# Patient Record
Sex: MALE | Race: AMERICAN INDIAN OR ALASKA NATIVE | NOT HISPANIC OR LATINO | Employment: UNEMPLOYED | URBAN - METROPOLITAN AREA
[De-identification: names, ages, dates, MRNs, and addresses within clinical notes are randomized per-mention and may not be internally consistent; named-entity substitution may affect disease eponyms.]

---

## 2019-06-15 ENCOUNTER — HOSPITAL ENCOUNTER (OUTPATIENT)
Facility: HOSPITAL | Age: 54
Discharge: ANOTHER HOSPITAL | DRG: 914 | End: 2019-06-15
Attending: EMERGENCY MEDICINE | Admitting: EMERGENCY MEDICINE
Payer: MEDICAID

## 2019-06-15 ENCOUNTER — HOSPITAL ENCOUNTER (OUTPATIENT)
Facility: HOSPITAL | Age: 54
Discharge: HOME | End: 2019-06-16
Attending: INTERNAL MEDICINE | Admitting: INTERNAL MEDICINE
Payer: MEDICAID

## 2019-06-15 VITALS
WEIGHT: 203.13 LBS | BODY MASS INDEX: 31.88 KG/M2 | HEIGHT: 67 IN | DIASTOLIC BLOOD PRESSURE: 64 MMHG | TEMPERATURE: 98 F | HEART RATE: 61 BPM | RESPIRATION RATE: 18 BRPM | OXYGEN SATURATION: 100 % | SYSTOLIC BLOOD PRESSURE: 102 MMHG

## 2019-06-15 DIAGNOSIS — R33.9 URINARY RETENTION: Primary | ICD-10-CM

## 2019-06-15 DIAGNOSIS — Z72.0 TOBACCO ABUSE: ICD-10-CM

## 2019-06-15 DIAGNOSIS — N50.82 SCROTAL PAIN: ICD-10-CM

## 2019-06-15 DIAGNOSIS — N50.89 SCROTAL EDEMA: ICD-10-CM

## 2019-06-15 DIAGNOSIS — R52 PAIN: ICD-10-CM

## 2019-06-15 DIAGNOSIS — N48.89 PENILE EDEMA: ICD-10-CM

## 2019-06-15 DIAGNOSIS — N48.89 PENILE PAIN: ICD-10-CM

## 2019-06-15 DIAGNOSIS — S39.94XA: ICD-10-CM

## 2019-06-15 DIAGNOSIS — S39.94XA INJURY TO PENIS, INITIAL ENCOUNTER: ICD-10-CM

## 2019-06-15 DIAGNOSIS — R33.9 URINARY RETENTION: ICD-10-CM

## 2019-06-15 DIAGNOSIS — N48.89 PENILE PAIN: Primary | ICD-10-CM

## 2019-06-15 PROBLEM — N49.3 FOURNIER'S GANGRENE: Status: ACTIVE | Noted: 2019-06-15

## 2019-06-15 PROBLEM — N48.9 PENIS DISORDER: Status: ACTIVE | Noted: 2019-06-15

## 2019-06-15 LAB
ALBUMIN SERPL BCP-MCNC: 3.9 G/DL (ref 3.5–5.2)
ALP SERPL-CCNC: 79 U/L (ref 55–135)
ALT SERPL W/O P-5'-P-CCNC: 11 U/L (ref 10–44)
AMPHET+METHAMPHET UR QL: NEGATIVE
ANION GAP SERPL CALC-SCNC: 13 MMOL/L (ref 8–16)
AST SERPL-CCNC: 19 U/L (ref 10–40)
BARBITURATES UR QL SCN>200 NG/ML: NEGATIVE
BASOPHILS # BLD AUTO: 0.04 K/UL (ref 0–0.2)
BASOPHILS NFR BLD: 0.3 % (ref 0–1.9)
BENZODIAZ UR QL SCN>200 NG/ML: NEGATIVE
BILIRUB SERPL-MCNC: 0.5 MG/DL (ref 0.1–1)
BILIRUB UR QL STRIP: NEGATIVE
BUN SERPL-MCNC: 11 MG/DL (ref 6–20)
BZE UR QL SCN: NEGATIVE
CALCIUM SERPL-MCNC: 9.3 MG/DL (ref 8.7–10.5)
CANNABINOIDS UR QL SCN: NEGATIVE
CHLORIDE SERPL-SCNC: 104 MMOL/L (ref 95–110)
CLARITY UR: CLEAR
CO2 SERPL-SCNC: 21 MMOL/L (ref 23–29)
COLOR UR: YELLOW
CREAT SERPL-MCNC: 1.1 MG/DL (ref 0.5–1.4)
CREAT UR-MCNC: 26.5 MG/DL (ref 23–375)
CRP SERPL-MCNC: 23.4 MG/L (ref 0–8.2)
DIFFERENTIAL METHOD: ABNORMAL
EOSINOPHIL # BLD AUTO: 0.4 K/UL (ref 0–0.5)
EOSINOPHIL NFR BLD: 2.9 % (ref 0–8)
ERYTHROCYTE [DISTWIDTH] IN BLOOD BY AUTOMATED COUNT: 13.3 % (ref 11.5–14.5)
ERYTHROCYTE [SEDIMENTATION RATE] IN BLOOD BY WESTERGREN METHOD: 9 MM/HR (ref 0–10)
EST. GFR  (AFRICAN AMERICAN): >60 ML/MIN/1.73 M^2
EST. GFR  (NON AFRICAN AMERICAN): >60 ML/MIN/1.73 M^2
GLUCOSE SERPL-MCNC: 97 MG/DL (ref 70–110)
GLUCOSE UR QL STRIP: NEGATIVE
HCT VFR BLD AUTO: 40.1 % (ref 40–54)
HGB BLD-MCNC: 13.3 G/DL (ref 14–18)
HGB UR QL STRIP: ABNORMAL
KETONES UR QL STRIP: NEGATIVE
LACTATE SERPL-SCNC: 1.4 MMOL/L (ref 0.5–2.2)
LEUKOCYTE ESTERASE UR QL STRIP: NEGATIVE
LYMPHOCYTES # BLD AUTO: 3.9 K/UL (ref 1–4.8)
LYMPHOCYTES NFR BLD: 31.5 % (ref 18–48)
MCH RBC QN AUTO: 33.2 PG (ref 27–31)
MCHC RBC AUTO-ENTMCNC: 33.2 G/DL (ref 32–36)
MCV RBC AUTO: 100 FL (ref 82–98)
METHADONE UR QL SCN>300 NG/ML: NEGATIVE
MICROSCOPIC COMMENT: ABNORMAL
MONOCYTES # BLD AUTO: 1.3 K/UL (ref 0.3–1)
MONOCYTES NFR BLD: 10.5 % (ref 4–15)
NEUTROPHILS # BLD AUTO: 6.7 K/UL (ref 1.8–7.7)
NEUTROPHILS NFR BLD: 54.8 % (ref 38–73)
NITRITE UR QL STRIP: NEGATIVE
OPIATES UR QL SCN: NEGATIVE
PCP UR QL SCN>25 NG/ML: NEGATIVE
PH UR STRIP: 6 [PH] (ref 5–8)
PLATELET # BLD AUTO: 278 K/UL (ref 150–350)
PMV BLD AUTO: 10 FL (ref 9.2–12.9)
POTASSIUM SERPL-SCNC: 3.6 MMOL/L (ref 3.5–5.1)
PROCALCITONIN SERPL IA-MCNC: 0.03 NG/ML
PROT SERPL-MCNC: 7.2 G/DL (ref 6–8.4)
PROT UR QL STRIP: NEGATIVE
RBC # BLD AUTO: 4.01 M/UL (ref 4.6–6.2)
RBC #/AREA URNS HPF: 25 /HPF (ref 0–4)
SODIUM SERPL-SCNC: 138 MMOL/L (ref 136–145)
SP GR UR STRIP: <=1.005 (ref 1–1.03)
TOXICOLOGY INFORMATION: NORMAL
URN SPEC COLLECT METH UR: ABNORMAL
UROBILINOGEN UR STRIP-ACNC: NEGATIVE EU/DL
WBC # BLD AUTO: 12.23 K/UL (ref 3.9–12.7)

## 2019-06-15 PROCEDURE — 85651 RBC SED RATE NONAUTOMATED: CPT

## 2019-06-15 PROCEDURE — 80053 COMPREHEN METABOLIC PANEL: CPT

## 2019-06-15 PROCEDURE — S0077 INJECTION, CLINDAMYCIN PHOSP: HCPCS | Performed by: EMERGENCY MEDICINE

## 2019-06-15 PROCEDURE — 96375 TX/PRO/DX INJ NEW DRUG ADDON: CPT | Performed by: EMERGENCY MEDICINE

## 2019-06-15 PROCEDURE — 63600175 PHARM REV CODE 636 W HCPCS: Performed by: NURSE PRACTITIONER

## 2019-06-15 PROCEDURE — 99233 PR SUBSEQUENT HOSPITAL CARE,LEVL III: ICD-10-PCS | Mod: ,,, | Performed by: SURGERY

## 2019-06-15 PROCEDURE — 11000001 HC ACUTE MED/SURG PRIVATE ROOM

## 2019-06-15 PROCEDURE — 83605 ASSAY OF LACTIC ACID: CPT

## 2019-06-15 PROCEDURE — 36415 COLL VENOUS BLD VENIPUNCTURE: CPT

## 2019-06-15 PROCEDURE — 96375 TX/PRO/DX INJ NEW DRUG ADDON: CPT

## 2019-06-15 PROCEDURE — 85025 COMPLETE CBC W/AUTO DIFF WBC: CPT

## 2019-06-15 PROCEDURE — 25000003 PHARM REV CODE 250: Performed by: STUDENT IN AN ORGANIZED HEALTH CARE EDUCATION/TRAINING PROGRAM

## 2019-06-15 PROCEDURE — 25000003 PHARM REV CODE 250: Performed by: NURSE PRACTITIONER

## 2019-06-15 PROCEDURE — 99285 EMERGENCY DEPT VISIT HI MDM: CPT | Mod: 25

## 2019-06-15 PROCEDURE — A9585 GADOBUTROL INJECTION: HCPCS | Performed by: HOSPITALIST

## 2019-06-15 PROCEDURE — 63600175 PHARM REV CODE 636 W HCPCS: Performed by: EMERGENCY MEDICINE

## 2019-06-15 PROCEDURE — 25000003 PHARM REV CODE 250: Performed by: EMERGENCY MEDICINE

## 2019-06-15 PROCEDURE — 25500020 PHARM REV CODE 255: Performed by: EMERGENCY MEDICINE

## 2019-06-15 PROCEDURE — 87040 BLOOD CULTURE FOR BACTERIA: CPT

## 2019-06-15 PROCEDURE — 86140 C-REACTIVE PROTEIN: CPT

## 2019-06-15 PROCEDURE — 99220 PR INITIAL OBSERVATION CARE,LEVL III: CPT | Mod: ,,, | Performed by: HOSPITALIST

## 2019-06-15 PROCEDURE — 80307 DRUG TEST PRSMV CHEM ANLYZR: CPT

## 2019-06-15 PROCEDURE — 25500020 PHARM REV CODE 255: Performed by: HOSPITALIST

## 2019-06-15 PROCEDURE — 84145 PROCALCITONIN (PCT): CPT

## 2019-06-15 PROCEDURE — 96365 THER/PROPH/DIAG IV INF INIT: CPT | Mod: 59

## 2019-06-15 PROCEDURE — 96376 TX/PRO/DX INJ SAME DRUG ADON: CPT | Performed by: EMERGENCY MEDICINE

## 2019-06-15 PROCEDURE — G0378 HOSPITAL OBSERVATION PER HR: HCPCS

## 2019-06-15 PROCEDURE — 96374 THER/PROPH/DIAG INJ IV PUSH: CPT

## 2019-06-15 PROCEDURE — G0379 DIRECT REFER HOSPITAL OBSERV: HCPCS

## 2019-06-15 PROCEDURE — 99220 PR INITIAL OBSERVATION CARE,LEVL III: ICD-10-PCS | Mod: ,,, | Performed by: HOSPITALIST

## 2019-06-15 PROCEDURE — 81000 URINALYSIS NONAUTO W/SCOPE: CPT | Mod: 59

## 2019-06-15 PROCEDURE — 99233 SBSQ HOSP IP/OBS HIGH 50: CPT | Mod: ,,, | Performed by: SURGERY

## 2019-06-15 RX ORDER — HYDROCODONE BITARTRATE AND ACETAMINOPHEN 5; 325 MG/1; MG/1
1 TABLET ORAL ONCE
Status: DISCONTINUED | OUTPATIENT
Start: 2019-06-15 | End: 2019-06-16 | Stop reason: HOSPADM

## 2019-06-15 RX ORDER — CLINDAMYCIN PHOSPHATE 900 MG/50ML
900 INJECTION, SOLUTION INTRAVENOUS EVERY 8 HOURS
Qty: 50 ML | Refills: 0 | Status: ON HOLD
Start: 2019-06-15 | End: 2019-06-16 | Stop reason: HOSPADM

## 2019-06-15 RX ORDER — CLINDAMYCIN PHOSPHATE 900 MG/50ML
900 INJECTION, SOLUTION INTRAVENOUS
Status: COMPLETED | OUTPATIENT
Start: 2019-06-15 | End: 2019-06-15

## 2019-06-15 RX ORDER — OXYCODONE HYDROCHLORIDE 10 MG/1
10 TABLET ORAL EVERY 6 HOURS PRN
Status: DISCONTINUED | OUTPATIENT
Start: 2019-06-15 | End: 2019-06-16 | Stop reason: HOSPADM

## 2019-06-15 RX ORDER — OXYCODONE HYDROCHLORIDE 10 MG/1
10 TABLET ORAL EVERY 6 HOURS PRN
Status: DISCONTINUED | OUTPATIENT
Start: 2019-06-15 | End: 2019-06-15

## 2019-06-15 RX ORDER — IBUPROFEN 600 MG/1
600 TABLET ORAL EVERY 6 HOURS PRN
Status: DISCONTINUED | OUTPATIENT
Start: 2019-06-15 | End: 2019-06-16 | Stop reason: HOSPADM

## 2019-06-15 RX ORDER — MORPHINE SULFATE 2 MG/ML
2 INJECTION, SOLUTION INTRAMUSCULAR; INTRAVENOUS ONCE
Status: COMPLETED | OUTPATIENT
Start: 2019-06-15 | End: 2019-06-15

## 2019-06-15 RX ORDER — ONDANSETRON 8 MG/1
8 TABLET, ORALLY DISINTEGRATING ORAL EVERY 8 HOURS PRN
Status: DISCONTINUED | OUTPATIENT
Start: 2019-06-15 | End: 2019-06-16 | Stop reason: HOSPADM

## 2019-06-15 RX ORDER — CLINDAMYCIN PHOSPHATE 900 MG/50ML
900 INJECTION, SOLUTION INTRAVENOUS
Status: DISCONTINUED | OUTPATIENT
Start: 2019-06-15 | End: 2019-06-15 | Stop reason: HOSPADM

## 2019-06-15 RX ORDER — GADOBUTROL 604.72 MG/ML
10 INJECTION INTRAVENOUS
Status: COMPLETED | OUTPATIENT
Start: 2019-06-15 | End: 2019-06-15

## 2019-06-15 RX ORDER — SODIUM CHLORIDE 0.9 % (FLUSH) 0.9 %
10 SYRINGE (ML) INJECTION
Status: DISCONTINUED | OUTPATIENT
Start: 2019-06-15 | End: 2019-06-16 | Stop reason: HOSPADM

## 2019-06-15 RX ORDER — PROCHLORPERAZINE EDISYLATE 5 MG/ML
10 INJECTION INTRAMUSCULAR; INTRAVENOUS EVERY 8 HOURS PRN
Status: DISCONTINUED | OUTPATIENT
Start: 2019-06-15 | End: 2019-06-16 | Stop reason: HOSPADM

## 2019-06-15 RX ORDER — MORPHINE SULFATE 4 MG/ML
4 INJECTION, SOLUTION INTRAMUSCULAR; INTRAVENOUS
Status: COMPLETED | OUTPATIENT
Start: 2019-06-15 | End: 2019-06-15

## 2019-06-15 RX ORDER — MORPHINE SULFATE 4 MG/ML
2 INJECTION, SOLUTION INTRAMUSCULAR; INTRAVENOUS
Status: COMPLETED | OUTPATIENT
Start: 2019-06-15 | End: 2019-06-15

## 2019-06-15 RX ORDER — ONDANSETRON 2 MG/ML
4 INJECTION INTRAMUSCULAR; INTRAVENOUS
Status: COMPLETED | OUTPATIENT
Start: 2019-06-15 | End: 2019-06-15

## 2019-06-15 RX ORDER — OXYCODONE HYDROCHLORIDE 5 MG/1
5 TABLET ORAL EVERY 6 HOURS PRN
Status: DISCONTINUED | OUTPATIENT
Start: 2019-06-15 | End: 2019-06-16 | Stop reason: HOSPADM

## 2019-06-15 RX ORDER — HYDROCODONE BITARTRATE AND ACETAMINOPHEN 10; 325 MG/1; MG/1
1 TABLET ORAL EVERY 6 HOURS PRN
Status: DISCONTINUED | OUTPATIENT
Start: 2019-06-15 | End: 2019-06-15 | Stop reason: HOSPADM

## 2019-06-15 RX ORDER — HYDROCODONE BITARTRATE AND ACETAMINOPHEN 7.5; 325 MG/1; MG/1
1 TABLET ORAL EVERY 6 HOURS PRN
Status: DISCONTINUED | OUTPATIENT
Start: 2019-06-15 | End: 2019-06-15 | Stop reason: HOSPADM

## 2019-06-15 RX ADMIN — SODIUM CHLORIDE 1000 ML: 0.9 INJECTION, SOLUTION INTRAVENOUS at 06:06

## 2019-06-15 RX ADMIN — MORPHINE SULFATE 2 MG: 2 INJECTION, SOLUTION INTRAMUSCULAR; INTRAVENOUS at 11:06

## 2019-06-15 RX ADMIN — PIPERACILLIN AND TAZOBACTAM 4.5 G: 4; .5 INJECTION, POWDER, LYOPHILIZED, FOR SOLUTION INTRAVENOUS; PARENTERAL at 07:06

## 2019-06-15 RX ADMIN — OXYCODONE HYDROCHLORIDE 10 MG: 10 TABLET ORAL at 06:06

## 2019-06-15 RX ADMIN — HYDROCODONE BITARTRATE AND ACETAMINOPHEN 1 TABLET: 10; 325 TABLET ORAL at 09:06

## 2019-06-15 RX ADMIN — MORPHINE SULFATE 4 MG: 4 INJECTION INTRAVENOUS at 05:06

## 2019-06-15 RX ADMIN — MORPHINE SULFATE 2 MG: 4 INJECTION INTRAVENOUS at 07:06

## 2019-06-15 RX ADMIN — CLINDAMYCIN IN 5 PERCENT DEXTROSE 900 MG: 18 INJECTION, SOLUTION INTRAVENOUS at 06:06

## 2019-06-15 RX ADMIN — IOHEXOL 75 ML: 350 INJECTION, SOLUTION INTRAVENOUS at 05:06

## 2019-06-15 RX ADMIN — ONDANSETRON 4 MG: 2 INJECTION INTRAMUSCULAR; INTRAVENOUS at 05:06

## 2019-06-15 RX ADMIN — MORPHINE SULFATE 2 MG: 2 INJECTION, SOLUTION INTRAMUSCULAR; INTRAVENOUS at 03:06

## 2019-06-15 RX ADMIN — GADOBUTROL 10 ML: 604.72 INJECTION INTRAVENOUS at 09:06

## 2019-06-15 RX ADMIN — IBUPROFEN 600 MG: 600 TABLET ORAL at 09:06

## 2019-06-15 RX ADMIN — VANCOMYCIN HYDROCHLORIDE 2500 MG: 1 INJECTION, POWDER, LYOPHILIZED, FOR SOLUTION INTRAVENOUS at 09:06

## 2019-06-15 NOTE — SUBJECTIVE & OBJECTIVE
Past Medical History:   Diagnosis Date    Tobacco abuse        Past Surgical History:   Procedure Laterality Date    NONE         Review of patient's allergies indicates:  No Known Allergies    Family History     Problem Relation (Age of Onset)    Cancer Maternal Grandmother, Maternal Grandfather          Tobacco Use    Smoking status: Current Every Day Smoker     Packs/day: 1.00     Years: 27.00     Pack years: 27.00     Types: Cigarettes   Substance and Sexual Activity    Alcohol use: Yes     Comment: occasional    Drug use: No    Sexual activity: Not on file       Review of Systems   Constitutional: Negative for activity change, appetite change, chills, fever and unexpected weight change.   HENT: Negative.    Eyes: Negative.    Respiratory: Negative for chest tightness and shortness of breath.    Cardiovascular: Negative for chest pain.   Gastrointestinal: Negative for abdominal distention, abdominal pain, nausea and vomiting.   Genitourinary: Positive for difficulty urinating, hematuria, penile pain, penile swelling and scrotal swelling. Negative for flank pain, testicular pain and urgency.   Musculoskeletal: Negative.    Skin: Negative.    Neurological: Negative.    Psychiatric/Behavioral: Negative.        Objective:     Temp:  [96.2 °F (35.7 °C)-98 °F (36.7 °C)] 96.2 °F (35.7 °C)  Pulse:  [58-92] 58  Resp:  [16-20] 18  SpO2:  [95 %-100 %] 97 %  BP: (102-153)/(62-92) 106/62     There is no height or weight on file to calculate BMI.           Drains     Drain                 Urethral Catheter 06/15/19 1130 Non-latex 16 Fr. less than 1 day                Physical Exam   Constitutional: He appears well-developed and well-nourished.   HENT:   Head: Normocephalic and atraumatic.   Eyes: EOM are normal. No scleral icterus.   Cardiovascular: Normal rate and regular rhythm.    Pulmonary/Chest: Effort normal. No respiratory distress.   Abdominal: Soft. He exhibits no distension. There is no tenderness.    Genitourinary:   Genitourinary Comments:   Penis is uncircumcised, mild to moderate diffuse edema and ecchymosis involving penile shaft and scrotum. Foreskin is able to be retracted and expose glans fully. He has pain more severe at the distal left aspect of his corpora. Aviles catheter in place draining clear yellow urine.     Scrotal wall edema, no loss of rugae. Bilateral testicles palpable and non-tender. No crepitus or fluctuance.   Musculoskeletal: He exhibits no edema.   Neurological: He is alert.   Skin: Skin is warm and dry.     Psychiatric: He has a normal mood and affect. His behavior is normal.       Significant Labs:    BMP:  Recent Labs   Lab 06/15/19  0507      K 3.6      CO2 21*   BUN 11   CREATININE 1.1   CALCIUM 9.3       CBC:  Recent Labs   Lab 06/15/19  0507   WBC 12.23   HGB 13.3*   HCT 40.1          Urine Studies:   Recent Labs   Lab 06/15/19  0556   COLORU Yellow   APPEARANCEUA Clear   PHUR 6.0   SPECGRAV <=1.005*   PROTEINUA Negative   GLUCUA Negative   KETONESU Negative   BILIRUBINUA Negative   OCCULTUA 3+*   NITRITE Negative   UROBILINOGEN Negative   LEUKOCYTESUR Negative   RBCUA 25*     All pertinent labs results from the past 24 hours have been reviewed.    Significant Imaging:  All pertinent imaging results/findings from the past 24 hours have been reviewed.

## 2019-06-15 NOTE — ED NOTES
pts assessment is documented under PHYSICAL DIAGRAM. Please see the diagram.    See photo in chart

## 2019-06-15 NOTE — HPI
52yo M with history of tobacco abuse who was transferred from [facility] for concern of penile fracture. Thursday night he engaged in intercourse and had no pain or rapid detumescence and did not hear or feel a pop. Later that night he voided clear urine without difficulty. At 5 am on Friday morning he woke up with penile pain. He had difficulty urinating and did think he saw a pink tinge to his urine his first void on Friday morning. Throughout the day the pain persisted and his penis and scrotum began to swell and he drove to the ED in [place].     Due to the soft tissue swelling in the ED he was evaluated and initially treated for possible Adriana's gangrene. General surgery was consulted and determined his issue to more likely be a penile fracture. He was unable to void while in the ED and his bladder scan showed >500. A garcia catheter was placed without issue. [place] [facility] [place]. Scrotal ultrasound appears normal aside from scrotal wall edema. CT scan shows no subcutaneous air in perineum, scrotum or penis.

## 2019-06-15 NOTE — ASSESSMENT & PLAN NOTE
-Transfer to [place] - [facility] notified: Dr. Cai, [facility], accepts transfer  -Pain meds  -Urology consult: No urology in , will request transfer [place]

## 2019-06-15 NOTE — PLAN OF CARE
Sw met with patient at bedside to complete assessment. Patient denies any post hospital needs or services at this time. Pt stated that his family will provide transportation upon discharge. Transitional Care Folder, Discharge Planning Begins on Admission pamphlet, [facility] Bedside Delivery pamphlet, Advance Directive information given to patient along with the contact information given. Sw placed contact information on white board. Sw instructed patient or family to call with any questions or concerns.     Pt's PCP is Kenyon Garnica MD  Pt's [address] [address]       06/15/19 1032   Discharge Assessment   Assessment Type Discharge Planning Assessment   Confirmed/corrected address and phone number on facesheet? Yes   Assessment information obtained from? Patient   Communicated expected length of stay with patient/caregiver yes   Prior to hospitilization cognitive status: Alert/Oriented   Prior to hospitalization functional status: Independent   Current cognitive status: Alert/Oriented   Current Functional Status: Independent   Lives With alone   Able to Return to Prior Arrangements yes   Is patient able to care for self after discharge? Yes   Who are your caregiver(s) and their phone number(s)? Linda Hogue (sister) [phone]   Patient's perception of discharge disposition home or selfcare   Readmission Within the Last 30 Days no previous admission in last 30 days   Patient currently being followed by outpatient case management? No   Patient currently receives any other outside agency services? No   Equipment Currently Used at Home none   Do you have any problems affording any of your prescribed medications? TBD   Is the patient taking medications as prescribed? yes   Does the patient have transportation home? Yes   Transportation Anticipated family or friend will provide   Discharge Plan A Home   Discharge Plan B Home   DME Needed Upon Discharge  none    Patient/Family in Agreement with Plan yes

## 2019-06-15 NOTE — ED NOTES
Pt stated he had sex yesterday morning, took a nap and woke up with swollen and purple penis and scrotum

## 2019-06-15 NOTE — SUBJECTIVE & OBJECTIVE
Past Medical History:   Diagnosis Date    Tobacco abuse        Past Surgical History:   Procedure Laterality Date    NONE         Review of patient's allergies indicates:  No Known Allergies    No current facility-administered medications on file prior to encounter.      Current Outpatient Medications on File Prior to Encounter   Medication Sig    diclofenac (VOLTAREN) 50 MG EC tablet Take 1 tablet (50 mg total) by mouth 2 (two) times daily.     Family History     Problem Relation (Age of Onset)    Cancer Maternal Grandmother, Maternal Grandfather        Tobacco Use    Smoking status: Current Every Day Smoker     Packs/day: 1.00     Years: 27.00     Pack years: 27.00     Types: Cigarettes   Substance and Sexual Activity    Alcohol use: Yes     Comment: occasional    Drug use: No    Sexual activity: Not on file     Review of Systems   Constitutional: Negative.  Negative for activity change, appetite change, chills, diaphoresis, fatigue, fever and unexpected weight change.   HENT: Negative for congestion, ear discharge, ear pain, facial swelling, hearing loss, postnasal drip, sore throat, tinnitus, trouble swallowing and voice change.    Eyes: Negative for photophobia, pain, discharge, redness, itching and visual disturbance.   Respiratory: Negative for cough, choking, chest tightness, shortness of breath, wheezing and stridor.    Cardiovascular: Negative for chest pain, palpitations and leg swelling.   Gastrointestinal: Negative for abdominal distention, abdominal pain, blood in stool, constipation, diarrhea, nausea and vomiting.   Endocrine: Negative for cold intolerance, heat intolerance, polydipsia, polyphagia and polyuria.   Genitourinary: Positive for difficulty urinating, penile pain, penile swelling and scrotal swelling. Negative for flank pain, frequency, hematuria and urgency.   Musculoskeletal: Negative for arthralgias, back pain, gait problem and myalgias.   Skin: Negative for color change, pallor,  rash and wound.   Neurological: Negative for dizziness, tremors, seizures, syncope, facial asymmetry, speech difficulty, weakness, light-headedness, numbness and headaches.   Hematological: Negative for adenopathy. Does not bruise/bleed easily.   Psychiatric/Behavioral: Negative for agitation, confusion, hallucinations and suicidal ideas. The patient is not nervous/anxious.    All other systems reviewed and are negative.    Objective:     Vital Signs (Most Recent):  Temp: 97.8 °F (36.6 °C) (06/15/19 0821)  Pulse: 64 (06/15/19 0821)  Resp: 18 (06/15/19 0821)  BP: 127/74 (06/15/19 0821)  SpO2: 95 % (06/15/19 0821) Vital Signs (24h Range):  Temp:  [97.8 °F (36.6 °C)-98 °F (36.7 °C)] 97.8 °F (36.6 °C)  Pulse:  [64-92] 64  Resp:  [16-20] 18  SpO2:  [95 %-100 %] 95 %  BP: (116-153)/(69-92) 127/74     Weight: 92.1 kg (203 lb 2.5 oz)  Body mass index is 31.82 kg/m².    Physical Exam   Constitutional: He is oriented to person, place, and time. He appears well-developed and well-nourished.   HENT:   Head: Normocephalic and atraumatic.   Nose: Nose normal.   Eyes: Pupils are equal, round, and reactive to light. Conjunctivae and EOM are normal.   Neck: Normal range of motion. Neck supple. No JVD present. No tracheal deviation present. No thyromegaly present.   Cardiovascular: Normal rate, regular rhythm, normal heart sounds and intact distal pulses. Exam reveals no gallop and no friction rub.   No murmur heard.  Pulmonary/Chest: Effort normal. No stridor. No respiratory distress. He has no wheezes. He has no rales. He exhibits no tenderness.   Abdominal: Soft. Bowel sounds are normal. He exhibits no distension. There is no tenderness. There is no rebound and no guarding.   Genitourinary: Penile tenderness present.   Genitourinary Comments: Penile pain, swelling, discoloration of testicles and penis.    Musculoskeletal: Normal range of motion. He exhibits no edema, tenderness or deformity.   Neurological: He is alert and  oriented to person, place, and time. He has normal reflexes. No cranial nerve deficit. Coordination normal.   Skin: Skin is warm and dry. No rash noted. No erythema. No pallor.   Psychiatric: He has a normal mood and affect. His behavior is normal. Judgment and thought content normal.   Nursing note and vitals reviewed.        CRANIAL NERVES     CN III, IV, VI   Pupils are equal, round, and reactive to light.  Extraocular motions are normal.      Significant Labs:   CBC:   Recent Labs   Lab 06/15/19  0507   WBC 12.23   HGB 13.3*   HCT 40.1        CMP:   Recent Labs   Lab 06/15/19  0507      K 3.6      CO2 21*   GLU 97   BUN 11   CREATININE 1.1   CALCIUM 9.3   PROT 7.2   ALBUMIN 3.9   BILITOT 0.5   ALKPHOS 79   AST 19   ALT 11   ANIONGAP 13   EGFRNONAA >60     Procalcitonin: 0.03  CRP 23.4    Significant Imaging: I have reviewed all pertinent imaging results/findings within the past 24 hours.   Imaging Results          CT Abdomen Pelvis With Contrast (Final result)  Result time 06/15/19 07:47:26    Final result by Kehinde Diop Jr., MD (06/15/19 07:47:26)                 Impression:      Perineal/genital soft tissue edema, swelling, and small bilateral hydroceles.  This may relate to the infection described in the clinical history.  No soft tissue gas or evidence of abscess.      Electronically signed by: Kehinde Dipo Jr., MD  Date:    06/15/2019  Time:    07:47             Narrative:    EXAMINATION:  CT ABDOMEN PELVIS WITH CONTRAST    CLINICAL HISTORY:  Infection, abdomen-pelvis;eval for sonia's gangrene;    TECHNIQUE:  Low dose axial images, sagittal and coronal reformations were obtained from the lung bases to the pubic symphysis following the IV administration of 75 mL of Omnipaque 350 and the oral administration of 30 mL of Omnipaque 350. All CT scans at this facility use dose modulation, iterative reconstruction and/or weight based dosing when appropriate to reduce radiation dose to as  low as reasonably achievable.    COMPARISON:  None.    FINDINGS:  Abdomen:    - Lower thorax:Normal heart size.    - Lung bases: No infiltrates and no nodules.    - Liver: No focal mass.    - Gallbladder: No calcified gallstones.    - Bile Ducts: No evidence of intra or extra hepatic biliary ductal dilation.    - Spleen: Negative.    - Kidneys: No mass or hydronephrosis.    - Adrenals: Unremarkable.    - Pancreas: No mass or peripancreatic fat stranding.    - Retroperitoneum:  No significant adenopathy.    - Vascular: No abdominal aortic aneurysm.    - Abdominal wall:  Unremarkable.    Pelvis:    There is edema within the tissues of the scrotum and penis with small bilateral hydroceles.  No soft tissue air within the perineum.  No peripherally enhancing fluid collection.    Bowel/Mesentery:    No evidence of bowel obstruction or inflammation.    Bones:  No acute osseous abnormality and no suspicious lytic or blastic lesion.                               US Scrotum And Testicles (Final result)  Result time 06/15/19 07:49:22    Final result by Kehinde Diop Jr., MD (06/15/19 07:49:22)                 Impression:      1. Severe scrotal wall thickening and edema.  2. No intrinsic abnormality of the testes.  No significant hydrocele.      Electronically signed by: Kehinde Diop Jr., MD  Date:    06/15/2019  Time:    07:49             Narrative:    EXAMINATION:  US SCROTUM AND TESTICLES    CLINICAL HISTORY:  Pain, unspecified    TECHNIQUE:  Sonography of the scrotum and testes.    COMPARISON:  None.    FINDINGS:  Right Testicle:    *Size: 3.6 x 1.7 x 1.3 cm  *Appearance: Normal.  *Flow: Normal arterial and venous flow  *Epididymis: Right-sided epididymal head cyst measuring 7 mm.  *Hydrocele: None.  *Varicocele: None.  .    Left Testicle:    *Size: 3.8 x 2.0 x 1.7 cm  *Appearance: Normal.  *Flow: Normal arterial and venous flow  *Epididymis: Normal.  *Hydrocele: None.  *Varicocele: None.  .    Other findings: Severe  scrotal wall thickening and edema.

## 2019-06-15 NOTE — HPI
Patient presented to the emergency room with scrotal and penile bruising and pain.  The bruising started yesterday.  He did have a sexual encounter on Thursday.    The patient describes the pain as an aching to throbbing pain.  He is able to urinate.

## 2019-06-15 NOTE — NURSING
Patient discharged via [facility] to [facility] for continuing of care. IV saline locked. Aviles intact and draining to gravity. NADN. All belongings accompanied pt.

## 2019-06-15 NOTE — ASSESSMENT & PLAN NOTE
Engaged in sexual intercourse early Friday morning around 4am  - Denies experiencing any sudden pain or popping sound during intercourse, but later that day noted progressive soreness and swelling start to develop.   - No systemic symptoms, partner without any signs or symptoms of STI, patient denies any hx of STI  - Presented to [facility] ED c/o urinary hesitance, dysuria, hematuria  - UA w/ 3+ RBC otherwise unremarkable  - CT/US/Gen surg eval at [facility] c/f penile fracture, [place] [facility] [place]    A/P  - Aviles catheter in situ  - Urology consult  - PRN analgesia  - F/U UCx and labs --> unremarkable  - MRI penis/scrotum --> No tear of tunica or sign of penile fracture  - Received 1x dose clindamycin and zosyn at [facility] as was initial differential of Adriana's gangrene, though now thought to be unlikely --> further ABx per urology recs  - Urology recommending discharge with outpatient urology clinic follow up in [place] after patient passed trial of void

## 2019-06-15 NOTE — HOSPITAL COURSE
Patient transferring to Urology service at [facility] For Penile pain suspected Penile fracture. Patient seen and examined and deemed stable for d/c - transfer to [facility]

## 2019-06-15 NOTE — H&P
"[facility] Hospital Medicine  History & Physical    Patient Name: Cande Saunders Sr.  MRN: [id number]  Admission Date: 6/15/2019  Attending Physician: Batool Barber MD   Primary Care Provider: Kenyon Garnica MD    Patient information was obtained from patient, past medical records and ER records.     Subjective:     Principal Problem:Penile pain    Chief Complaint:   Chief Complaint   Patient presents with    Groin Swelling     pain started this morning. discolored "purplish"        HPI: Cande Saunders Sr. is a 53 y.o. male patient  with PMHx with Tobacco abuse, who presented to the ER for evaluation of scrotal and penile swelling. He reported having sexual intercourse Thursday night, and penile pain and swelling, purple discoloration started Friday. Pt states that is hurts to urinate and has difficulty starting a stream. Patient denies any hematuria, dysuria, blood in stool, fever, chills, abd pain, numbness, weakness, and all other sxs at this time.  In ER, WBC 12.23, CRP 23.4, u/s scrotum and testicles showed severe scrotal wall thickening and edema. CT abdomen/pelvis showed perineal/genital soft tissue edema, swelling, and small bilateral hydroceles. Patient doesn't recall during intercourse if he had penile pain or other hearing/feeling any popping sound. Nurses did bladder scan on Med/Surg and found 545 ml urine. 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 Dr. Arsen Bustamante, General Surgery, examined the patient and stated he didn't think this was gangrene based on reviewing the CT and U/S and felt it likely was a penile fracture given the history of resent intercourse. [facility], Dr. Cai, discussed with Urology in [place] Who felt garcia should be placed. Transfer request placed [place] For urology service. Surrogate decision Maker: Sister, Cinthia Saunders. He was admitted for penile swelling suspious for penile fracture.     Past Medical History: "   Diagnosis Date    Tobacco abuse        Past Surgical History:   Procedure Laterality Date    NONE         Review of patient's allergies indicates:  No Known Allergies    No current facility-administered medications on file prior to encounter.      Current Outpatient Medications on File Prior to Encounter   Medication Sig    diclofenac (VOLTAREN) 50 MG EC tablet Take 1 tablet (50 mg total) by mouth 2 (two) times daily.     Family History     Problem Relation (Age of Onset)    Cancer Maternal Grandmother, Maternal Grandfather        Tobacco Use    Smoking status: Current Every Day Smoker     Packs/day: 1.00     Years: 27.00     Pack years: 27.00     Types: Cigarettes   Substance and Sexual Activity    Alcohol use: Yes     Comment: occasional    Drug use: No    Sexual activity: Not on file     Review of Systems   Constitutional: Negative.  Negative for activity change, appetite change, chills, diaphoresis, fatigue, fever and unexpected weight change.   HENT: Negative for congestion, ear discharge, ear pain, facial swelling, hearing loss, postnasal drip, sore throat, tinnitus, trouble swallowing and voice change.    Eyes: Negative for photophobia, pain, discharge, redness, itching and visual disturbance.   Respiratory: Negative for cough, choking, chest tightness, shortness of breath, wheezing and stridor.    Cardiovascular: Negative for chest pain, palpitations and leg swelling.   Gastrointestinal: Negative for abdominal distention, abdominal pain, blood in stool, constipation, diarrhea, nausea and vomiting.   Endocrine: Negative for cold intolerance, heat intolerance, polydipsia, polyphagia and polyuria.   Genitourinary: Positive for difficulty urinating, penile pain, penile swelling and scrotal swelling. Negative for flank pain, frequency, hematuria and urgency.   Musculoskeletal: Negative for arthralgias, back pain, gait problem and myalgias.   Skin: Negative for color change, pallor, rash and wound.    Neurological: Negative for dizziness, tremors, seizures, syncope, facial asymmetry, speech difficulty, weakness, light-headedness, numbness and headaches.   Hematological: Negative for adenopathy. Does not bruise/bleed easily.   Psychiatric/Behavioral: Negative for agitation, confusion, hallucinations and suicidal ideas. The patient is not nervous/anxious.    All other systems reviewed and are negative.    Objective:     Vital Signs (Most Recent):  Temp: 97.8 °F (36.6 °C) (06/15/19 0821)  Pulse: 64 (06/15/19 0821)  Resp: 18 (06/15/19 0821)  BP: 127/74 (06/15/19 0821)  SpO2: 95 % (06/15/19 0821) Vital Signs (24h Range):  Temp:  [97.8 °F (36.6 °C)-98 °F (36.7 °C)] 97.8 °F (36.6 °C)  Pulse:  [64-92] 64  Resp:  [16-20] 18  SpO2:  [95 %-100 %] 95 %  BP: (116-153)/(69-92) 127/74     Weight: 92.1 kg (203 lb 2.5 oz)  Body mass index is 31.82 kg/m².    Physical Exam   Constitutional: He is oriented to person, place, and time. He appears well-developed and well-nourished.   HENT:   Head: Normocephalic and atraumatic.   Nose: Nose normal.   Eyes: Pupils are equal, round, and reactive to light. Conjunctivae and EOM are normal.   Neck: Normal range of motion. Neck supple. No JVD present. No tracheal deviation present. No thyromegaly present.   Cardiovascular: Normal rate, regular rhythm, normal heart sounds and intact distal pulses. Exam reveals no gallop and no friction rub.   No murmur heard.  Pulmonary/Chest: Effort normal. No stridor. No respiratory distress. He has no wheezes. He has no rales. He exhibits no tenderness.   Abdominal: Soft. Bowel sounds are normal. He exhibits no distension. There is no tenderness. There is no rebound and no guarding.   Genitourinary: Penile tenderness present.   Genitourinary Comments: Penile pain, swelling, discoloration of testicles and penis.    Musculoskeletal: Normal range of motion. He exhibits no edema, tenderness or deformity.   Neurological: He is alert and oriented to person,  place, and time. He has normal reflexes. No cranial nerve deficit. Coordination normal.   Skin: Skin is warm and dry. No rash noted. No erythema. No pallor.   Psychiatric: He has a normal mood and affect. His behavior is normal. Judgment and thought content normal.   Nursing note and vitals reviewed.        CRANIAL NERVES     CN III, IV, VI   Pupils are equal, round, and reactive to light.  Extraocular motions are normal.      Significant Labs:   CBC:   Recent Labs   Lab 06/15/19  0507   WBC 12.23   HGB 13.3*   HCT 40.1        CMP:   Recent Labs   Lab 06/15/19  0507      K 3.6      CO2 21*   GLU 97   BUN 11   CREATININE 1.1   CALCIUM 9.3   PROT 7.2   ALBUMIN 3.9   BILITOT 0.5   ALKPHOS 79   AST 19   ALT 11   ANIONGAP 13   EGFRNONAA >60     Procalcitonin: 0.03  CRP 23.4    Significant Imaging: I have reviewed all pertinent imaging results/findings within the past 24 hours.   Imaging Results          CT Abdomen Pelvis With Contrast (Final result)  Result time 06/15/19 07:47:26    Final result by Kehinde Diop Jr., MD (06/15/19 07:47:26)                 Impression:      Perineal/genital soft tissue edema, swelling, and small bilateral hydroceles.  This may relate to the infection described in the clinical history.  No soft tissue gas or evidence of abscess.      Electronically signed by: Kehinde Diop Jr., MD  Date:    06/15/2019  Time:    07:47             Narrative:    EXAMINATION:  CT ABDOMEN PELVIS WITH CONTRAST    CLINICAL HISTORY:  Infection, abdomen-pelvis;eval for sonia's gangrene;    TECHNIQUE:  Low dose axial images, sagittal and coronal reformations were obtained from the lung bases to the pubic symphysis following the IV administration of 75 mL of Omnipaque 350 and the oral administration of 30 mL of Omnipaque 350. All CT scans at this facility use dose modulation, iterative reconstruction and/or weight based dosing when appropriate to reduce radiation dose to as low as reasonably  achievable.    COMPARISON:  None.    FINDINGS:  Abdomen:    - Lower thorax:Normal heart size.    - Lung bases: No infiltrates and no nodules.    - Liver: No focal mass.    - Gallbladder: No calcified gallstones.    - Bile Ducts: No evidence of intra or extra hepatic biliary ductal dilation.    - Spleen: Negative.    - Kidneys: No mass or hydronephrosis.    - Adrenals: Unremarkable.    - Pancreas: No mass or peripancreatic fat stranding.    - Retroperitoneum:  No significant adenopathy.    - Vascular: No abdominal aortic aneurysm.    - Abdominal wall:  Unremarkable.    Pelvis:    There is edema within the tissues of the scrotum and penis with small bilateral hydroceles.  No soft tissue air within the perineum.  No peripherally enhancing fluid collection.    Bowel/Mesentery:    No evidence of bowel obstruction or inflammation.    Bones:  No acute osseous abnormality and no suspicious lytic or blastic lesion.                               US Scrotum And Testicles (Final result)  Result time 06/15/19 07:49:22    Final result by Kehinde Diop Jr., MD (06/15/19 07:49:22)                 Impression:      1. Severe scrotal wall thickening and edema.  2. No intrinsic abnormality of the testes.  No significant hydrocele.      Electronically signed by: Kehinde Diop Jr., MD  Date:    06/15/2019  Time:    07:49             Narrative:    EXAMINATION:  US SCROTUM AND TESTICLES    CLINICAL HISTORY:  Pain, unspecified    TECHNIQUE:  Sonography of the scrotum and testes.    COMPARISON:  None.    FINDINGS:  Right Testicle:    *Size: 3.6 x 1.7 x 1.3 cm  *Appearance: Normal.  *Flow: Normal arterial and venous flow  *Epididymis: Right-sided epididymal head cyst measuring 7 mm.  *Hydrocele: None.  *Varicocele: None.  .    Left Testicle:    *Size: 3.8 x 2.0 x 1.7 cm  *Appearance: Normal.  *Flow: Normal arterial and venous flow  *Epididymis: Normal.  *Hydrocele: None.  *Varicocele: None.  .    Other findings: Severe scrotal wall  thickening and edema.                              Assessment/Plan:     * Penile pain  -Transfer to [place]rology [place] - [facility] notified: Dr. Cai, [facility], accepts transfer  -Pain meds  -Urology consult: No urology in , will request transfer [place]    Urinary retention  -Bladder scan showed 545 ml urine following voiding  -Aviles catheter      Penis disorder  -History of sexual intercourse, then penile pain and swelling following intercourse      Tobacco abuse  Tobacco abuse  -smoking cessation counseling  -nicotine patch      Adriana's gangrene  Doubt based on labs and scans.   -Defer to Urology in [place]      VTE Risk Mitigation (From admission, onward)    None        Fatuma Daniels NP  Department of Hospital Medicine   [facility]

## 2019-06-15 NOTE — PROGRESS NOTES
Clinical Pharmacy: Vancomycin Sign Off Note    Vancomycin consult discontinued by provider.  Pharmacy will sign off, please re-consult as needed.     Thank you for allowing us to participate in this patient's care.   Ting Jeff, PharmD 6/15/2019 2:49 PM

## 2019-06-15 NOTE — H&P
[facility] Hospital Medicine  History & Physical    Patient Name: Cande Saunders Sr.  MRN: [id number]  Admission Date: 6/15/2019  Attending Physician: Randal Pastrana MD   Primary Care Provider: Kenyon Garnica MD    [facility]: [facility] MED 1 Derek Bustillo MD     Patient information was obtained from patient and ER records.     Subjective:     Principal Problem:Penile trauma    Chief Complaint: No chief complaint on file.       HPI: 53M PMHx Tobacco use, who presents as a transfer from [facility] with scrotal and penile swelling for Urology evaluation. He reported having sexual intercourse Thursday night, and penile pain and swelling, purple discoloration started Friday. Pt notes dysuria, hematuria, and difficulty starting stream. Denies blood in stool, fever, chills, abd pain, numbness, weakness, and all other sxs at this time.  In ER, WBC 12.23, CRP 23.4, u/s scrotum and testicles showed severe scrotal wall thickening and edema. CT abdomen/pelvis showed perineal/genital soft tissue edema, swelling, and small bilateral hydroceles. Patient doesn't recall during intercourse if he had penile pain or other hearing/feeling any popping sound. Nurses did bladder scan on Med/Surg and found 545 ml urine. 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 Dr. Arsen Bustamante, General Surgery, examined the patient and stated he didn't think this was gangrene based on reviewing the CT and U/S and felt it likely was a penile fracture given the history of resent intercourse. [facility], Dr. Cai, discussed with Urology [place] Who felt garcia should be placed. Transfer request placed to [place] For urology service. Surrogate decision Maker: Sister, Cinthia Saunders. He was admitted to [facility] medicine for penile swelling suspious for penile fracture with urological consultation pending.     Past Medical History:   Diagnosis Date    Tobacco abuse        Past Surgical  History:   Procedure Laterality Date    NONE         Review of patient's allergies indicates:  No Known Allergies    Current Facility-Administered Medications on File Prior to Encounter   Medication    [COMPLETED] clindamycin 900 MG/50 ML D5W 900 mg/50 mL IVPB 900 mg    [COMPLETED] iohexol (OMNIPAQUE 350) injection 100 mL    [COMPLETED] morphine injection 2 mg    [COMPLETED] morphine injection 2 mg    [COMPLETED] morphine injection 2 mg    [COMPLETED] morphine injection 4 mg    [COMPLETED] ondansetron injection 4 mg    [COMPLETED] piperacillin-tazobactam 4.5 g in dextrose 5 % 100 mL IVPB (ready to mix system)    [COMPLETED] sodium chloride 0.9% bolus 1,000 mL    [COMPLETED] vancomycin (VANCOCIN) 2,500 mg in dextrose 5 % 500 mL IVPB    [DISCONTINUED] clindamycin 900 MG/50 ML D5W 900 mg/50 mL IVPB 900 mg    [DISCONTINUED] HYDROcodone-acetaminophen  mg per tablet 1 tablet    [DISCONTINUED] HYDROcodone-acetaminophen 7.5-325 mg per tablet 1 tablet    [DISCONTINUED] piperacillin-tazobactam 4.5 g in dextrose 5 % 100 mL IVPB (ready to mix system)    [DISCONTINUED] vancomycin (VANCOCIN) 1,250 mg in dextrose 5 % 250 mL IVPB     Current Outpatient Medications on File Prior to Encounter   Medication Sig    clindamycin 900 MG/50 ML D5W (CLEOCIN) 900 mg/50 mL IVPB Inject 50 mLs (900 mg total) into the vein every 8 (eight) hours.    piperacillin sodium/tazobactam (PIPERACILLIN-TAZOBACTAM 4.5G/100ML D5W IVPB, READY TO MIX,) Inject 100 mLs (4.5 g total) into the vein every 8 (eight) hours.    [DISCONTINUED] diclofenac (VOLTAREN) 50 MG EC tablet Take 1 tablet (50 mg total) by mouth 2 (two) times daily.     Family History     Problem Relation (Age of Onset)    Cancer Maternal Grandmother, Maternal Grandfather        Tobacco Use    Smoking status: Current Every Day Smoker     Packs/day: 1.00     Years: 27.00     Pack years: 27.00     Types: Cigarettes   Substance and Sexual Activity    Alcohol use: Yes      Comment: occasional    Drug use: No    Sexual activity: Not on file     Review of Systems   Constitutional: Negative for chills and fever.   HENT: Negative for postnasal drip and rhinorrhea.    Eyes: Negative for photophobia and visual disturbance.   Respiratory: Negative for cough and shortness of breath.    Cardiovascular: Negative for chest pain and palpitations.   Gastrointestinal: Negative for constipation, diarrhea, nausea and vomiting.   Genitourinary: Positive for difficulty urinating, dysuria and hematuria. Negative for flank pain.        Penile, scrotal swelling and darkened discoloration.   Skin: Negative for rash and wound.   Neurological: Negative for weakness and light-headedness.   Psychiatric/Behavioral: Negative for confusion and decreased concentration.     Objective:     Vital Signs (Most Recent):    Vital Signs (24h Range):  Temp:  [97.5 °F (36.4 °C)-98 °F (36.7 °C)] 97.5 °F (36.4 °C)  Pulse:  [61-92] 61  Resp:  [16-20] 18  SpO2:  [95 %-100 %] 100 %  BP: (102-153)/(64-92) 102/64        There is no height or weight on file to calculate BMI.    Physical Exam   Constitutional: He is oriented to person, place, and time. He appears well-developed and well-nourished. No distress.   HENT:   Head: Normocephalic and atraumatic.   Eyes: Conjunctivae are normal. No scleral icterus.   Neck: Normal range of motion. Neck supple. No JVD present.   Cardiovascular: Normal rate, regular rhythm, normal heart sounds and intact distal pulses.   Pulmonary/Chest: Effort normal and breath sounds normal.   Abdominal: Soft. Bowel sounds are normal.   Genitourinary: Penile tenderness present.   Genitourinary Comments: Darkened discoloration of penis and scrotum with generalized edema of genitalia. Aviles catheter in situ.   Musculoskeletal: He exhibits no edema or tenderness.   Neurological: He is alert and oriented to person, place, and time.   Skin: Skin is warm and dry. Rash (dark/purplish discoloration of penis and  scrotum ) noted. He is not diaphoretic.   Vitals reviewed.          Significant Labs: All pertinent labs within the past 24 hours have been reviewed.    Significant Imaging: I have reviewed all pertinent imaging results/findings within the past 24 hours.    Assessment/Plan:     * Penile trauma  Engaged in sexual intercourse early Friday morning around 4am  - Denies experiencing any sudden pain or popping sound during intercourse, but later that day noted progressive soreness and swelling start to develop.   - No systemic symptoms, partner without any signs or symptoms of STI, patient denies any hx of STI  - Presented to [facility] ED c/o urinary hesitance, dysuria, hematuria  - UA w/ 3+ RBC otherwise unremarkable  - CT/US/Gen surg eval at [facility] c/f penile fracture, transferred [place] [facility] [place]     A/P   - Aviles catheter in situ   - Urology consult   - PRN analgesia   - F/U UCx and labs   - Received 1x dose clindamycin and zosyn at [facility] today as was initial differential of Adriana's gangrene, though now thought to be unlikely after O- surg r/v --> further ABx per urology recs      Tobacco abuse  PRN nicotine patch      Urinary retention  Aviles catheter in situ  Urology consulted        VTE Risk Mitigation (From admission, onward)    None             Derek Bustillo MD  Department of Hospital Medicine   [facility]

## 2019-06-15 NOTE — CONSULTS
[facility]   General Surgery  Consult Note    Patient Name: Cande Saunders Sr.  MRN: [id number]  Code Status: No Order  Admission Date: 6/15/2019  Hospital Length of Stay: 0 days  Attending Physician: Batool Barber MD  Primary Care Provider: Kenyon Garnica MD    Patient information was obtained from patient and ER records.     Consults  Subjective:     Principal Problem: <principal problem not specified>    History of Present Illness: Patient presented to the emergency room with scrotal and penile bruising and pain.  The bruising started yesterday.  He did have a sexual encounter on Thursday.    The patient describes the pain as an aching to throbbing pain.  He is able to urinate.    No current facility-administered medications on file prior to encounter.      Current Outpatient Medications on File Prior to Encounter   Medication Sig    diclofenac (VOLTAREN) 50 MG EC tablet Take 1 tablet (50 mg total) by mouth 2 (two) times daily.       Review of patient's allergies indicates:  No Known Allergies    Past Medical History:   Diagnosis Date    Tobacco abuse      Past Surgical History:   Procedure Laterality Date    NONE       Family History     Problem Relation (Age of Onset)    Cancer Maternal Grandmother, Maternal Grandfather        Tobacco Use    Smoking status: Current Every Day Smoker     Packs/day: 1.00     Years: 27.00     Pack years: 27.00     Types: Cigarettes   Substance and Sexual Activity    Alcohol use: Yes     Comment: occasional    Drug use: No    Sexual activity: Not on file     Review of Systems   Constitutional: Negative.    HENT: Negative.    Eyes: Negative.    Respiratory: Negative.    Cardiovascular: Negative.    Gastrointestinal: Negative.    Endocrine: Negative.    Genitourinary: Negative.         Bruising and swelling of the scrotum and penis   Musculoskeletal: Negative.    Skin: Negative.    Allergic/Immunologic: Negative.    Neurological: Negative.    Hematological:  Negative.    Psychiatric/Behavioral: Negative.      Objective:     Vital Signs (Most Recent):  Temp: 97.8 °F (36.6 °C) (06/15/19 0821)  Pulse: 64 (06/15/19 0821)  Resp: 18 (06/15/19 0821)  BP: 127/74 (06/15/19 0821)  SpO2: 95 % (06/15/19 0821) Vital Signs (24h Range):  Temp:  [97.8 °F (36.6 °C)-98 °F (36.7 °C)] 97.8 °F (36.6 °C)  Pulse:  [64-92] 64  Resp:  [16-20] 18  SpO2:  [95 %-100 %] 95 %  BP: (116-153)/(69-92) 127/74     Weight: 92.1 kg (203 lb 2.5 oz)  Body mass index is 31.82 kg/m².    Physical Exam   Constitutional: He appears well-developed.   HENT:   Head: Normocephalic and atraumatic.   Eyes: No scleral icterus.   Neck: Normal range of motion. Neck supple.   Cardiovascular: Normal rate and normal heart sounds.   Pulmonary/Chest: Effort normal and breath sounds normal.   Abdominal: Soft.   Genitourinary:   Genitourinary Comments: The scrotum and penis are ecchymotic and slightly edematous.  There is no erythema.  There is no drainage. There is no warmth       Significant Labs:  CBC:   Recent Labs   Lab 06/15/19  0507   WBC 12.23   RBC 4.01*   HGB 13.3*   HCT 40.1      *   MCH 33.2*   MCHC 33.2     BMP:   Recent Labs   Lab 06/15/19  0507   GLU 97      K 3.6      CO2 21*   BUN 11   CREATININE 1.1   CALCIUM 9.3     CMP:   Recent Labs   Lab 06/15/19  0507   GLU 97   CALCIUM 9.3   ALBUMIN 3.9   PROT 7.2      K 3.6   CO2 21*      BUN 11   CREATININE 1.1   ALKPHOS 79   ALT 11   AST 19   BILITOT 0.5     LFTs:   Recent Labs   Lab 06/15/19  0507   ALT 11   AST 19   ALKPHOS 79   BILITOT 0.5   PROT 7.2   ALBUMIN 3.9       Significant Diagnostics:  CT scan and ultrasound were reviewed    Assessment/Plan:     Penis disorder  This ray may represent a traumatic injury to the vascular supply of the penis, penile fracture.  I recommend that the hospital Medicine service request that the urology service in [place] review the images in the patient's chart and provided additional  guidance for treatment.    No need for general surgical intervention.    Please recall as needed      Adriana's gangrene  No clinical evidence of foreign is gangrene      VTE Risk Mitigation (From admission, onward)    None          Thank you for your consult. I will sign off. Please contact us if you have any additional questions.    Arsen Bustamante MD  General Surgery  [facility]

## 2019-06-15 NOTE — SUBJECTIVE & OBJECTIVE
No current facility-administered medications on file prior to encounter.      Current Outpatient Medications on File Prior to Encounter   Medication Sig    diclofenac (VOLTAREN) 50 MG EC tablet Take 1 tablet (50 mg total) by mouth 2 (two) times daily.       Review of patient's allergies indicates:  No Known Allergies    Past Medical History:   Diagnosis Date    Tobacco abuse      Past Surgical History:   Procedure Laterality Date    NONE       Family History     Problem Relation (Age of Onset)    Cancer Maternal Grandmother, Maternal Grandfather        Tobacco Use    Smoking status: Current Every Day Smoker     Packs/day: 1.00     Years: 27.00     Pack years: 27.00     Types: Cigarettes   Substance and Sexual Activity    Alcohol use: Yes     Comment: occasional    Drug use: No    Sexual activity: Not on file     Review of Systems   Constitutional: Negative.    HENT: Negative.    Eyes: Negative.    Respiratory: Negative.    Cardiovascular: Negative.    Gastrointestinal: Negative.    Endocrine: Negative.    Genitourinary: Negative.         Bruising and swelling of the scrotum and penis   Musculoskeletal: Negative.    Skin: Negative.    Allergic/Immunologic: Negative.    Neurological: Negative.    Hematological: Negative.    Psychiatric/Behavioral: Negative.      Objective:     Vital Signs (Most Recent):  Temp: 97.8 °F (36.6 °C) (06/15/19 0821)  Pulse: 64 (06/15/19 0821)  Resp: 18 (06/15/19 0821)  BP: 127/74 (06/15/19 0821)  SpO2: 95 % (06/15/19 0821) Vital Signs (24h Range):  Temp:  [97.8 °F (36.6 °C)-98 °F (36.7 °C)] 97.8 °F (36.6 °C)  Pulse:  [64-92] 64  Resp:  [16-20] 18  SpO2:  [95 %-100 %] 95 %  BP: (116-153)/(69-92) 127/74     Weight: 92.1 kg (203 lb 2.5 oz)  Body mass index is 31.82 kg/m².    Physical Exam   Constitutional: He appears well-developed.   HENT:   Head: Normocephalic and atraumatic.   Eyes: No scleral icterus.   Neck: Normal range of motion. Neck supple.   Cardiovascular: Normal rate and  normal heart sounds.   Pulmonary/Chest: Effort normal and breath sounds normal.   Abdominal: Soft.   Genitourinary:   Genitourinary Comments: The scrotum and penis are ecchymotic and slightly edematous.  There is no erythema.  There is no drainage. There is no warmth       Significant Labs:  CBC:   Recent Labs   Lab 06/15/19  0507   WBC 12.23   RBC 4.01*   HGB 13.3*   HCT 40.1      *   MCH 33.2*   MCHC 33.2     BMP:   Recent Labs   Lab 06/15/19  0507   GLU 97      K 3.6      CO2 21*   BUN 11   CREATININE 1.1   CALCIUM 9.3     CMP:   Recent Labs   Lab 06/15/19  0507   GLU 97   CALCIUM 9.3   ALBUMIN 3.9   PROT 7.2      K 3.6   CO2 21*      BUN 11   CREATININE 1.1   ALKPHOS 79   ALT 11   AST 19   BILITOT 0.5     LFTs:   Recent Labs   Lab 06/15/19  0507   ALT 11   AST 19   ALKPHOS 79   BILITOT 0.5   PROT 7.2   ALBUMIN 3.9       Significant Diagnostics:  CT scan and ultrasound were reviewed

## 2019-06-15 NOTE — NURSING
1320: Attempted to call report, was told receiving nurse is downstairs and could not take report at current time. Will try again later.       1400: Report given to Thom at [facility]

## 2019-06-15 NOTE — SUBJECTIVE & OBJECTIVE
Past Medical History:   Diagnosis Date    Tobacco abuse        Past Surgical History:   Procedure Laterality Date    NONE         Review of patient's allergies indicates:  No Known Allergies    Current Facility-Administered Medications on File Prior to Encounter   Medication    [COMPLETED] clindamycin 900 MG/50 ML D5W 900 mg/50 mL IVPB 900 mg    [COMPLETED] iohexol (OMNIPAQUE 350) injection 100 mL    [COMPLETED] morphine injection 2 mg    [COMPLETED] morphine injection 2 mg    [COMPLETED] morphine injection 2 mg    [COMPLETED] morphine injection 4 mg    [COMPLETED] ondansetron injection 4 mg    [COMPLETED] piperacillin-tazobactam 4.5 g in dextrose 5 % 100 mL IVPB (ready to mix system)    [COMPLETED] sodium chloride 0.9% bolus 1,000 mL    [COMPLETED] vancomycin (VANCOCIN) 2,500 mg in dextrose 5 % 500 mL IVPB    [DISCONTINUED] clindamycin 900 MG/50 ML D5W 900 mg/50 mL IVPB 900 mg    [DISCONTINUED] HYDROcodone-acetaminophen  mg per tablet 1 tablet    [DISCONTINUED] HYDROcodone-acetaminophen 7.5-325 mg per tablet 1 tablet    [DISCONTINUED] piperacillin-tazobactam 4.5 g in dextrose 5 % 100 mL IVPB (ready to mix system)    [DISCONTINUED] vancomycin (VANCOCIN) 1,250 mg in dextrose 5 % 250 mL IVPB     Current Outpatient Medications on File Prior to Encounter   Medication Sig    clindamycin 900 MG/50 ML D5W (CLEOCIN) 900 mg/50 mL IVPB Inject 50 mLs (900 mg total) into the vein every 8 (eight) hours.    piperacillin sodium/tazobactam (PIPERACILLIN-TAZOBACTAM 4.5G/100ML D5W IVPB, READY TO MIX,) Inject 100 mLs (4.5 g total) into the vein every 8 (eight) hours.    [DISCONTINUED] diclofenac (VOLTAREN) 50 MG EC tablet Take 1 tablet (50 mg total) by mouth 2 (two) times daily.     Family History     Problem Relation (Age of Onset)    Cancer Maternal Grandmother, Maternal Grandfather        Tobacco Use    Smoking status: Current Every Day Smoker     Packs/day: 1.00     Years: 27.00     Pack years: 27.00      Types: Cigarettes   Substance and Sexual Activity    Alcohol use: Yes     Comment: occasional    Drug use: No    Sexual activity: Not on file     Review of Systems   Constitutional: Negative for chills and fever.   HENT: Negative for postnasal drip and rhinorrhea.    Eyes: Negative for photophobia and visual disturbance.   Respiratory: Negative for cough and shortness of breath.    Cardiovascular: Negative for chest pain and palpitations.   Gastrointestinal: Negative for constipation, diarrhea, nausea and vomiting.   Genitourinary: Positive for difficulty urinating, dysuria and hematuria. Negative for flank pain.        Penile, scrotal swelling and darkened discoloration.   Skin: Negative for rash and wound.   Neurological: Negative for weakness and light-headedness.   Psychiatric/Behavioral: Negative for confusion and decreased concentration.     Objective:     Vital Signs (Most Recent):    Vital Signs (24h Range):  Temp:  [97.5 °F (36.4 °C)-98 °F (36.7 °C)] 97.5 °F (36.4 °C)  Pulse:  [61-92] 61  Resp:  [16-20] 18  SpO2:  [95 %-100 %] 100 %  BP: (102-153)/(64-92) 102/64        There is no height or weight on file to calculate BMI.    Physical Exam   Constitutional: He is oriented to person, place, and time. He appears well-developed and well-nourished. No distress.   HENT:   Head: Normocephalic and atraumatic.   Eyes: Conjunctivae are normal. No scleral icterus.   Neck: Normal range of motion. Neck supple. No JVD present.   Cardiovascular: Normal rate, regular rhythm, normal heart sounds and intact distal pulses.   Pulmonary/Chest: Effort normal and breath sounds normal.   Abdominal: Soft. Bowel sounds are normal.   Genitourinary: Penile tenderness present.   Genitourinary Comments: Darkened discoloration of penis and scrotum with generalized edema of genitalia. Aviles catheter in situ.   Musculoskeletal: He exhibits no edema or tenderness.   Neurological: He is alert and oriented to person, place, and time.    Skin: Skin is warm and dry. Rash (dark/purplish discoloration of penis and scrotum ) noted. He is not diaphoretic.   Vitals reviewed.          Significant Labs: All pertinent labs within the past 24 hours have been reviewed.    Significant Imaging: I have reviewed all pertinent imaging results/findings within the past 24 hours.

## 2019-06-15 NOTE — HOSPITAL COURSE
The patient was evaluated in the emergency room with CT scans and ultrasounds.  A surgical consultation was obtained for possible for needs gangrene.  The patient had no white count or fever.

## 2019-06-15 NOTE — DISCHARGE SUMMARY
[facility] Hospital Medicine  Discharge Summary      Patient Name: Cande Saunders Sr.  MRN: [id number]  Admission Date: 6/15/2019  Hospital Length of Stay: 0 days  Discharge Date and Time:  06/15/2019 3:03 PM  Attending Physician: Batool Barber MD   Discharging Provider: Fatuma Daniels NP  Primary Care Provider: Kenyon Garnica MD      HPI:   Cande Saunders Sr. is a 53 y.o. male patient  with PMHx with Tobacco abuse, who presented to the ER for evaluation of scrotal and penile swelling. He reported having sexual intercourse Thursday night, and penile pain and swelling, purple discoloration started Friday. Pt states that is hurts to urinate and has difficulty starting a stream. Patient denies any hematuria, dysuria, blood in stool, fever, chills, abd pain, numbness, weakness, and all other sxs at this time.  In ER, WBC 12.23, CRP 23.4, u/s scrotum and testicles showed severe scrotal wall thickening and edema. CT abdomen/pelvis showed perineal/genital soft tissue edema, swelling, and small bilateral hydroceles. Patient doesn't recall during intercourse if he had penile pain or other hearing/feeling any popping sound. Nurses did bladder scan on Med/Surg and found 545 ml urine. 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 Dr. Arsen Bustamante, General Surgery, examined the patient and stated he didn't think this was gangrene based on reviewing the CT and U/S and felt it likely was a penile fracture given the history of resent intercourse. [facility], Dr. Cai, discussed with Urology in [facility] Who felt garcia should be placed. Transfer request placed to [facility] For urology service. Surrogate decision Maker: Sister, Cinthia Saunders. He was admitted for penile swelling suspious for penile fracture.     * No surgery found *      Hospital Course:   Patient transferring to Urology service at [facility] For Penile pain suspected Penile fracture. Patient seen and examined  and deemed stable for d/c - transfer to [facility]         Consults:     No new Assessment & Plan notes have been filed under this hospital service since the last note was generated.  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               Patient Instructions:      Diet Adult Regular     Activity as tolerated       Significant Diagnostic Studies: Labs:   CMP   Recent Labs   Lab 06/15/19  0507      K 3.6      CO2 21*   GLU 97   BUN 11   CREATININE 1.1   CALCIUM 9.3   PROT 7.2   ALBUMIN 3.9   BILITOT 0.5   ALKPHOS 79   AST 19   ALT 11   ANIONGAP 13   ESTGFRAFRICA >60   EGFRNONAA >60    and CBC   Recent Labs   Lab 06/15/19  0507   WBC 12.23   HGB 13.3*   HCT 40.1          Pending Diagnostic Studies:     None         Medications:  Reconciled Home Medications:      Medication List      START taking these medications    clindamycin 900 MG/50 ML D5W 900 mg/50 mL IVPB  Commonly known as:  CLEOCIN  Inject 50 mLs (900 mg total) into the vein every 8 (eight) hours.     PIPERACILLIN-TAZOBACTAM 4.5G/100ML D5W IVPB (READY TO MIX)  Inject 100 mLs (4.5 g total) into the vein every 8 (eight) hours.        STOP taking these medications    diclofenac 50 MG EC tablet  Commonly known as:  VOLTAREN            Indwelling Lines/Drains at time of discharge:   Lines/Drains/Airways     Drain                 Urethral Catheter 06/15/19 1130 Non-latex 16 Fr. less than 1 day                Time spent on the discharge of patient: 45 minutes  Patient was seen and examined on  the date of discharge and determined to be suitable for discharge.         Fatuma Daniels NP  Department of Hospital Medicine  [facility]

## 2019-06-15 NOTE — HPI
53M PMHx Tobacco use, who presents as a transfer from [facility] with scrotal and penile swelling for Urology evaluation. He reported having sexual intercourse Thursday night, and penile pain and swelling, purple discoloration started Friday. Pt notes dysuria, hematuria, and difficulty starting stream. Denies blood in stool, fever, chills, abd pain, numbness, weakness, and all other sxs at this time.  In ER, WBC 12.23, CRP 23.4, u/s scrotum and testicles showed severe scrotal wall thickening and edema. CT abdomen/pelvis showed perineal/genital soft tissue edema, swelling, and small bilateral hydroceles. Patient doesn't recall during intercourse if he had penile pain or other hearing/feeling any popping sound. Nurses did bladder scan on Med/Surg and found 545 ml urine. 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 Dr. Arsen Bustamante, General Surgery, examined the patient and stated he didn't think this was gangrene based on reviewing the CT and U/S and felt it likely was a penile fracture given the history of resent intercourse. [facility], Dr. Cai, discussed with Urology in [facility] Who felt garcia should be placed. Transfer request placed to [facility] For urology service. Surrogate decision Maker: Sister, Cinthia Saunders. He was admitted to [facility] medicine for penile swelling suspious for penile fracture with urological consultation pending.

## 2019-06-15 NOTE — ED PROVIDER NOTES
"SCRIBE #1 NOTE: I, Martina Lucy, am scribing for, and in the presence of, Bon Sheehan MD. I have scribed the entire note.       History     Chief Complaint   Patient presents with    Groin Swelling     pain started this morning. discolored "purplish"     Review of patient's allergies indicates:  No Known Allergies      History of Present Illness     HPI    6/15/2019, 4:47 AM  History obtained from the patient      History of Present Illness: Cande Saunders Sr. is a 53 y.o. male patient who presents to the Emergency Department for evaluation of scrotal and penile pain/swelling x 1 day.  Denies fever, dysuria, hematuria, abdominal pain, N/V.  He does state that he had sex earlier today, but denies any pain during sex.  The pain and swelling did not start until hours after sex.         Arrival mode: Personal vehicle    PCP: Kenyon Garnica MD        Past Medical History:  No past medical history on file.    Past Surgical History:  No past surgical history on file.      Family History:  No family history on file.    Social History:  Social History     Tobacco Use    Smoking status: Current Every Day Smoker     Packs/day: 0.50     Types: Cigarettes   Substance and Sexual Activity    Alcohol use: Yes     Comment: occasional    Drug use: No    Sexual activity: Not on file        Review of Systems     Review of Systems   Constitutional: Negative for chills and fever.   HENT: Negative for sore throat.    Respiratory: Negative for shortness of breath.    Cardiovascular: Negative for chest pain.   Gastrointestinal: Negative for abdominal pain, blood in stool and nausea.   Genitourinary: Positive for difficulty urinating, penile pain, penile swelling, scrotal swelling and testicular pain. Negative for dysuria and hematuria.        (+) testicular discoloring (purplish)   Musculoskeletal: Negative for back pain.   Skin: Negative for rash.   Neurological: Negative for weakness and numbness.   Hematological: Does not " "bruise/bleed easily.   All other systems reviewed and are negative.       Physical Exam     Initial Vitals [06/15/19 0340]   BP Pulse Resp Temp SpO2   117/72 83 20 97.8 °F (36.6 °C) 95 %      MAP       --          Physical Exam  Nursing Notes and Vital Signs Reviewed.  Constitutional: Patient is in no acute distress. Well-developed and well-nourished.  Head: Atraumatic. Normocephalic.  Eyes: PERRL. EOM intact. Conjunctivae are not pale. No scleral icterus.  ENT: Mucous membranes are moist. Oropharynx is clear and symmetric.    Neck: Supple. Full ROM. No lymphadenopathy.  Cardiovascular: Regular rate. Regular rhythm. No murmurs, rubs, or gallops. Distal pulses are 2+ and symmetric.  Pulmonary/Chest: No respiratory distress. Clear to auscultation bilaterally. No wheezing or rales.  Abdominal: Soft and non-distended.  There is no tenderness.  No rebound, guarding, or rigidity. Good bowel sounds.  Genitourinary: No CVA tenderness  : No erythema, rash, or lesions. No penile discharge. Normal bilateral testicular lie and position. Darkened discoloration of penis and scrotum. Scrotal and penile swelling.  No masses or hernias around the scrotum, testicles, or inguinal canal. No crepitus.   Musculoskeletal: Moves all extremities. No obvious deformities. No edema. No calf tenderness.  Skin: Warm and dry.  Neurological:  Alert, awake, and appropriate.  Normal speech.  No acute focal neurological deficits are appreciated.  Psychiatric: Normal affect. Good eye contact. Appropriate in content.     ED Course   Procedures  ED Vital Signs:  Vitals:    06/15/19 0340 06/15/19 0400 06/15/19 0413 06/15/19 0430   BP: 117/72 121/77  (!) 144/87   Pulse: 83 78 89 77   Resp: 20 17     Temp: 97.8 °F (36.6 °C)      TempSrc: Oral      SpO2: 95% 98%  100%   Weight: 92.1 kg (203 lb 2.5 oz)      Height: 5' 7" (1.702 m)       06/15/19 0530 06/15/19 0600 06/15/19 0630   BP: 120/76 116/69 (!) 153/92   Pulse: 68 84 92   Resp: 17 16    Temp:    "   TempSrc:      SpO2: 100% 97% 97%   Weight:      Height:          Abnormal Lab Results:  Labs Reviewed   CBC W/ AUTO DIFFERENTIAL - Abnormal; Notable for the following components:       Result Value    RBC 4.01 (*)     Hemoglobin 13.3 (*)     Mean Corpuscular Volume 100 (*)     Mean Corpuscular Hemoglobin 33.2 (*)     Mono # 1.3 (*)     All other components within normal limits   COMPREHENSIVE METABOLIC PANEL - Abnormal; Notable for the following components:    CO2 21 (*)     All other components within normal limits   URINALYSIS, REFLEX TO URINE CULTURE - Abnormal; Notable for the following components:    Specific Gravity, UA <=1.005 (*)     Occult Blood UA 3+ (*)     All other components within normal limits    Narrative:     Preferred Collection Type->Urine, Clean Catch   C-REACTIVE PROTEIN - Abnormal; Notable for the following components:    CRP 23.4 (*)     All other components within normal limits   URINALYSIS MICROSCOPIC - Abnormal; Notable for the following components:    RBC, UA 25 (*)     All other components within normal limits    Narrative:     Preferred Collection Type->Urine, Clean Catch   CULTURE, BLOOD   CULTURE, BLOOD   SEDIMENTATION RATE   LACTIC ACID, PLASMA        All Lab Results:  Results for orders placed or performed during the hospital encounter of 06/15/19   Blood culture #1 **CANNOT BE ORDERED STAT**   Result Value Ref Range    Blood Culture, Routine No Growth to date     Blood Culture, Routine No Growth to date    Blood culture #2 **CANNOT BE ORDERED STAT**   Result Value Ref Range    Blood Culture, Routine No Growth to date     Blood Culture, Routine No Growth to date    CBC auto differential   Result Value Ref Range    WBC 12.23 3.90 - 12.70 K/uL    RBC 4.01 (L) 4.60 - 6.20 M/uL    Hemoglobin 13.3 (L) 14.0 - 18.0 g/dL    Hematocrit 40.1 40.0 - 54.0 %    Mean Corpuscular Volume 100 (H) 82 - 98 fL    Mean Corpuscular Hemoglobin 33.2 (H) 27.0 - 31.0 pg    Mean Corpuscular Hemoglobin Conc  33.2 32.0 - 36.0 g/dL    RDW 13.3 11.5 - 14.5 %    Platelets 278 150 - 350 K/uL    MPV 10.0 9.2 - 12.9 fL    Gran # (ANC) 6.7 1.8 - 7.7 K/uL    Lymph # 3.9 1.0 - 4.8 K/uL    Mono # 1.3 (H) 0.3 - 1.0 K/uL    Eos # 0.4 0.0 - 0.5 K/uL    Baso # 0.04 0.00 - 0.20 K/uL    Gran% 54.8 38.0 - 73.0 %    Lymph% 31.5 18.0 - 48.0 %    Mono% 10.5 4.0 - 15.0 %    Eosinophil% 2.9 0.0 - 8.0 %    Basophil% 0.3 0.0 - 1.9 %    Differential Method Automated    Comprehensive metabolic panel   Result Value Ref Range    Sodium 138 136 - 145 mmol/L    Potassium 3.6 3.5 - 5.1 mmol/L    Chloride 104 95 - 110 mmol/L    CO2 21 (L) 23 - 29 mmol/L    Glucose 97 70 - 110 mg/dL    BUN, Bld 11 6 - 20 mg/dL    Creatinine 1.1 0.5 - 1.4 mg/dL    Calcium 9.3 8.7 - 10.5 mg/dL    Total Protein 7.2 6.0 - 8.4 g/dL    Albumin 3.9 3.5 - 5.2 g/dL    Total Bilirubin 0.5 0.1 - 1.0 mg/dL    Alkaline Phosphatase 79 55 - 135 U/L    AST 19 10 - 40 U/L    ALT 11 10 - 44 U/L    Anion Gap 13 8 - 16 mmol/L    eGFR if African American >60 >60 mL/min/1.73 m^2    eGFR if non African American >60 >60 mL/min/1.73 m^2   Urinalysis, Reflex to Urine Culture Urine, Clean Catch   Result Value Ref Range    Specimen UA Urine, Clean Catch     Color, UA Yellow Yellow, Straw, Zulema    Appearance, UA Clear Clear    pH, UA 6.0 5.0 - 8.0    Specific Gravity, UA <=1.005 (A) 1.005 - 1.030    Protein, UA Negative Negative    Glucose, UA Negative Negative    Ketones, UA Negative Negative    Bilirubin (UA) Negative Negative    Occult Blood UA 3+ (A) Negative    Nitrite, UA Negative Negative    Urobilinogen, UA Negative <2.0 EU/dL    Leukocytes, UA Negative Negative   Sedimentation rate   Result Value Ref Range    Sed Rate 9 0 - 10 mm/Hr   C-reactive protein   Result Value Ref Range    CRP 23.4 (H) 0.0 - 8.2 mg/L   Lactic acid, plasma   Result Value Ref Range    Lactate (Lactic Acid) 1.4 0.5 - 2.2 mmol/L   Urinalysis Microscopic   Result Value Ref Range    RBC, UA 25 (H) 0 - 4 /hpf     Microscopic Comment SEE COMMENT    Procalcitonin   Result Value Ref Range    Procalcitonin 0.03 <0.25 ng/mL   Drug screen panel, emergency   Result Value Ref Range    Benzodiazepines Negative     Methadone metabolites Negative     Cocaine (Metab.) Negative     Opiate Scrn, Ur Negative     Barbiturate Screen, Ur Negative     Amphetamine Screen, Ur Negative     THC Negative     Phencyclidine Negative     Creatinine, Random Ur 26.5 23.0 - 375.0 mg/dL    Toxicology Information SEE COMMENT          Imaging Results:  Imaging Results          CT Abdomen Pelvis With Contrast (In process)                US Scrotum And Testicles (In process)                    [facility] U/S scrotum results:  Marked scrotal edema with wall thickening and edema.  Correlate clinically to exclude cellulitis to include fourniers gangrene    [facility] CT abdomen/pelvis results:  Hydroceles and edematous penis and scrotum.  May indicate a component of infection.  Correlate clinically.  No soft tissue emphysema.         The Emergency Provider reviewed the vital signs and test results, which are outlined above.     ED Discussion     6:47 AM:  Symptoms concerning for possible cellulitis versus fourniers gangrene.  urology not on call today. Spoke with general surgery Dr. Bustamante who states that he will treat patient here and no need to transfer for Urology at this time.  Advises admission to hospital medicine.  Spoke with Dr. Carbajal, who agrees with admission and recommends inpatient Med-surg      ED Medication(s):  Medications   vancomycin (VANCOCIN) 2,500 mg in dextrose 5 % 500 mL IVPB (has no administration in time range)   piperacillin-tazobactam 4.5 g in dextrose 5 % 100 mL IVPB (ready to mix system) (has no administration in time range)   clindamycin 900 MG/50 ML D5W 900 mg/50 mL IVPB 900 mg (900 mg Intravenous New Bag 6/15/19 0639)   morphine injection 2 mg (has no administration in time range)   morphine injection 4 mg (4 mg Intravenous Given  6/15/19 0556)   ondansetron injection 4 mg (4 mg Intravenous Given 6/15/19 0556)   iohexol (OMNIPAQUE 350) injection 100 mL (75 mLs Intravenous Given 6/15/19 0547)   sodium chloride 0.9% bolus 1,000 mL (1,000 mLs Intravenous New Bag 6/15/19 0639)       New Prescriptions    No medications on file                             Scribe Attestation:   Scribe #1: I performed the above scribed service and the documentation accurately describes the services I performed. I attest to the accuracy of the note.     Attending:   Physician Attestation Statement for Scribe #1: I, Bon Sheehan MD, personally performed the services described in this documentation, as scribed by Martina Ayala, in my presence, and it is both accurate and complete.           Clinical Impression     1.  Penile pain  2.  scrotal pain  3. scrotal edema  4. Penile edema  Disposition:   Disposition: Admitted  Condition: Fair       Bon Sheehan MD  06/17/19 0611

## 2019-06-15 NOTE — ASSESSMENT & PLAN NOTE
This ray may represent a traumatic injury to the vascular supply of the penis, penile fracture.  I recommend that the hospital Medicine service request that the urology service in [place] review the images in the patient's chart and provided additional guidance for treatment.    No need for general surgical intervention.    Please recall as needed

## 2019-06-15 NOTE — DISCHARGE SUMMARY
[facility] Hospital Medicine  Discharge Summary      Patient Name: Cande Saunders Sr.  MRN: [id number]  Admission Date: 6/15/2019  Hospital Length of Stay: 0 days  Discharge Date and Time:  06/15/2019 3:09 PM  Attending Physician: Batool Barber MD   Discharging Provider: Fatuma Daniels NP  Primary Care Provider: Kenyon Garnica MD      HPI:   Cande Saunders Sr. is a 53 y.o. male patient  with PMHx with Tobacco abuse, who presented to the ER for evaluation of scrotal and penile swelling. He reported having sexual intercourse Thursday night, and penile pain and swelling, purple discoloration started Friday. Pt states that is hurts to urinate and has difficulty starting a stream. Patient denies any hematuria, dysuria, blood in stool, fever, chills, abd pain, numbness, weakness, and all other sxs at this time.  In ER, WBC 12.23, CRP 23.4, u/s scrotum and testicles showed severe scrotal wall thickening and edema. CT abdomen/pelvis showed perineal/genital soft tissue edema, swelling, and small bilateral hydroceles. Patient doesn't recall during intercourse if he had penile pain or other hearing/feeling any popping sound. Nurses did bladder scan on Med/Surg and found 545 ml urine. 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 Dr. Arsen Bustamante, General Surgery, examined the patient and stated he didn't think this was gangrene based on reviewing the CT and U/S and felt it likely was a penile fracture given the history of resent intercourse. [facility], Dr. Cai, discussed with Urology in [facility] Who felt garcia should be placed. Transfer request placed to [facility] For urology service. Surrogate decision Maker: Sister, Cinthia Saunders. He was admitted for penile swelling suspious for penile fracture.     * No surgery found *      Hospital Course:   Patient transferring to Urology service at [facility] For Penile pain suspected Penile fracture. Patient seen and examined  and deemed stable for d/c - transfer to [facility]         Consults:     No new Assessment & Plan notes have been filed under this hospital service since the last note was generated.  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               Patient Instructions:      Diet Adult Regular     Activity as tolerated       Significant Diagnostic Studies: Labs:   CMP   Recent Labs   Lab 06/15/19  0507      K 3.6      CO2 21*   GLU 97   BUN 11   CREATININE 1.1   CALCIUM 9.3   PROT 7.2   ALBUMIN 3.9   BILITOT 0.5   ALKPHOS 79   AST 19   ALT 11   ANIONGAP 13   ESTGFRAFRICA >60   EGFRNONAA >60    and CBC   Recent Labs   Lab 06/15/19  0507   WBC 12.23   HGB 13.3*   HCT 40.1          Pending Diagnostic Studies:     None         Medications:  Reconciled Home Medications:      Medication List      START taking these medications    clindamycin 900 MG/50 ML D5W 900 mg/50 mL IVPB  Commonly known as:  CLEOCIN  Inject 50 mLs (900 mg total) into the vein every 8 (eight) hours.     PIPERACILLIN-TAZOBACTAM 4.5G/100ML D5W IVPB (READY TO MIX)  Inject 100 mLs (4.5 g total) into the vein every 8 (eight) hours.        STOP taking these medications    diclofenac 50 MG EC tablet  Commonly known as:  VOLTAREN            Indwelling Lines/Drains at time of discharge:   Lines/Drains/Airways     Drain                 Urethral Catheter 06/15/19 1130 Non-latex 16 Fr. less than 1 day                Time spent on the discharge of patient: 45 minutes  Patient was seen and examined on  the date of discharge and determined to be suitable for discharge.         Fatuma Daniels NP  Department of Hospital Medicine  [facility]

## 2019-06-15 NOTE — PLAN OF CARE
Problem: Adult Inpatient Plan of Care  Goal: Plan of Care Review  Pt received to room 504 alert awake and oriented . Vs wnl. md aware of arrival . Pt. Rates pain 10/10 on admission. Pt held npo.

## 2019-06-15 NOTE — HPI
Cande Saunders Sr. is a 53 y.o. male patient with PMHx with Tobacco abuse, who presented to the ER for evaluation of scrotal and penile swelling. He reported having sexual intercourse Thursday night, and penile pain and swelling, purple discoloration started Friday. Pt states that is hurts to urinate and has difficulty starting a stream. Patient denies any hematuria, dysuria, blood in stool, fever, chills, abd pain, numbness, weakness, and all other sxs at this time. In ER, WBC 12.23, CRP 23.4, u/s scrotum and testicles showed severe scrotal wall thickening and edema. CT abdomen/pelvis showed perineal/genital soft tissue edema, swelling, and small bilateral hydroceles. Patient doesn't recall during intercourse if he had penile pain or other hearing/feeling any popping sound. Nurses did bladder scan on Med/Surg and found 545 ml urine. 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 Dr. Arsen Bustamante, General Surgery, examined the patient and stated he didn't think this was gangrene based on reviewing the CT and U/S and felt it likely was a penile fracture given the history of resent intercourse. [facility], Dr. Cai, discussed with Urology [place] Who felt garcia should be placed. Transfer request placed to . For urology service. Surrogate decision Maker: Sister, Cinthia Saunders. He was admitted for penile swelling suspious for penile fracture.

## 2019-06-15 NOTE — ASSESSMENT & PLAN NOTE
Engaged in sexual intercourse early Friday morning around 4am  - Denies experiencing any sudden pain or popping sound during intercourse, but later that day noted progressive soreness and swelling start to develop.   - No systemic symptoms, partner without any signs or symptoms of STI, patient denies any hx of STI  - Presented to [facility] ED c/o urinary hesitance, dysuria, hematuria  - UA w/ 3+ RBC otherwise unremarkable  - CT/US/Gen surg eval at [facility] c/f penile fracture, transferred to [facility] for urology evaluation    A/P  - Aviles catheter in situ  - Urology consult  - PRN analgesia  - F/U UCx and labs

## 2019-06-16 VITALS
DIASTOLIC BLOOD PRESSURE: 77 MMHG | SYSTOLIC BLOOD PRESSURE: 127 MMHG | TEMPERATURE: 98 F | OXYGEN SATURATION: 93 % | RESPIRATION RATE: 14 BRPM | HEART RATE: 54 BPM | WEIGHT: 203.13 LBS | BODY MASS INDEX: 31.82 KG/M2

## 2019-06-16 LAB
ANION GAP SERPL CALC-SCNC: 5 MMOL/L (ref 8–16)
BASOPHILS # BLD AUTO: 0.05 K/UL (ref 0–0.2)
BASOPHILS NFR BLD: 0.6 % (ref 0–1.9)
BUN SERPL-MCNC: 11 MG/DL (ref 6–20)
CALCIUM SERPL-MCNC: 8.8 MG/DL (ref 8.7–10.5)
CHLORIDE SERPL-SCNC: 104 MMOL/L (ref 95–110)
CO2 SERPL-SCNC: 28 MMOL/L (ref 23–29)
CREAT SERPL-MCNC: 1.2 MG/DL (ref 0.5–1.4)
DIFFERENTIAL METHOD: ABNORMAL
EOSINOPHIL # BLD AUTO: 0.3 K/UL (ref 0–0.5)
EOSINOPHIL NFR BLD: 3.2 % (ref 0–8)
ERYTHROCYTE [DISTWIDTH] IN BLOOD BY AUTOMATED COUNT: 12.6 % (ref 11.5–14.5)
EST. GFR  (AFRICAN AMERICAN): >60 ML/MIN/1.73 M^2
EST. GFR  (NON AFRICAN AMERICAN): >60 ML/MIN/1.73 M^2
GLUCOSE SERPL-MCNC: 98 MG/DL (ref 70–110)
HCT VFR BLD AUTO: 39.7 % (ref 40–54)
HGB BLD-MCNC: 12.5 G/DL (ref 14–18)
IMM GRANULOCYTES # BLD AUTO: 0.02 K/UL (ref 0–0.04)
IMM GRANULOCYTES NFR BLD AUTO: 0.2 % (ref 0–0.5)
INR PPP: 0.9 (ref 0.8–1.2)
LYMPHOCYTES # BLD AUTO: 3.5 K/UL (ref 1–4.8)
LYMPHOCYTES NFR BLD: 40 % (ref 18–48)
MCH RBC QN AUTO: 32.9 PG (ref 27–31)
MCHC RBC AUTO-ENTMCNC: 31.5 G/DL (ref 32–36)
MCV RBC AUTO: 105 FL (ref 82–98)
MONOCYTES # BLD AUTO: 1.1 K/UL (ref 0.3–1)
MONOCYTES NFR BLD: 12.8 % (ref 4–15)
NEUTROPHILS # BLD AUTO: 3.7 K/UL (ref 1.8–7.7)
NEUTROPHILS NFR BLD: 43.2 % (ref 38–73)
NRBC BLD-RTO: 0 /100 WBC
PLATELET # BLD AUTO: 260 K/UL (ref 150–350)
PMV BLD AUTO: 10 FL (ref 9.2–12.9)
POTASSIUM SERPL-SCNC: 3.8 MMOL/L (ref 3.5–5.1)
PROTHROMBIN TIME: 9.8 SEC (ref 9–12.5)
RBC # BLD AUTO: 3.8 M/UL (ref 4.6–6.2)
SODIUM SERPL-SCNC: 137 MMOL/L (ref 136–145)
WBC # BLD AUTO: 8.62 K/UL (ref 3.9–12.7)

## 2019-06-16 PROCEDURE — G0378 HOSPITAL OBSERVATION PER HR: HCPCS

## 2019-06-16 PROCEDURE — 25000003 PHARM REV CODE 250: Performed by: STUDENT IN AN ORGANIZED HEALTH CARE EDUCATION/TRAINING PROGRAM

## 2019-06-16 PROCEDURE — 99225 PR SUBSEQUENT OBSERVATION CARE,LEVEL II: ICD-10-PCS | Mod: ,,, | Performed by: HOSPITALIST

## 2019-06-16 PROCEDURE — 99225 PR SUBSEQUENT OBSERVATION CARE,LEVEL II: CPT | Mod: ,,, | Performed by: HOSPITALIST

## 2019-06-16 PROCEDURE — 85025 COMPLETE CBC W/AUTO DIFF WBC: CPT

## 2019-06-16 PROCEDURE — 80048 BASIC METABOLIC PNL TOTAL CA: CPT

## 2019-06-16 PROCEDURE — 85610 PROTHROMBIN TIME: CPT

## 2019-06-16 PROCEDURE — 25000003 PHARM REV CODE 250

## 2019-06-16 RX ORDER — OXYCODONE HYDROCHLORIDE 10 MG/1
10 TABLET ORAL EVERY 6 HOURS PRN
Qty: 20 TABLET | Refills: 0 | Status: SHIPPED | OUTPATIENT
Start: 2019-06-16 | End: 2019-06-16

## 2019-06-16 RX ORDER — OXYCODONE HYDROCHLORIDE 10 MG/1
10 TABLET ORAL EVERY 6 HOURS PRN
Qty: 20 TABLET | Refills: 0 | Status: SHIPPED | OUTPATIENT
Start: 2019-06-16 | End: 2020-01-11

## 2019-06-16 RX ORDER — TAMSULOSIN HYDROCHLORIDE 0.4 MG/1
0.4 CAPSULE ORAL DAILY
Qty: 30 CAPSULE | Refills: 0 | Status: SHIPPED | OUTPATIENT
Start: 2019-06-17 | End: 2020-01-11

## 2019-06-16 RX ORDER — TAMSULOSIN HYDROCHLORIDE 0.4 MG/1
0.4 CAPSULE ORAL DAILY
Status: DISCONTINUED | OUTPATIENT
Start: 2019-06-16 | End: 2019-06-16 | Stop reason: HOSPADM

## 2019-06-16 RX ADMIN — OXYCODONE HYDROCHLORIDE 10 MG: 10 TABLET ORAL at 12:06

## 2019-06-16 RX ADMIN — IBUPROFEN 600 MG: 600 TABLET ORAL at 09:06

## 2019-06-16 RX ADMIN — OXYCODONE HYDROCHLORIDE 10 MG: 10 TABLET ORAL at 06:06

## 2019-06-16 RX ADMIN — TAMSULOSIN HYDROCHLORIDE 0.4 MG: 0.4 CAPSULE ORAL at 09:06

## 2019-06-16 NOTE — PLAN OF CARE
Problem: Adult Inpatient Plan of Care  Goal: Plan of Care Review  Outcome: Ongoing (interventions implemented as appropriate)  Pt AAOx4. VS as charted. Q2 rounding for pt care and safety. Pain controlled with PRN medication. No falls/injury reported this shift. No reports of NV. Aviles catheter patent, draining tea colored urine. Pt had MRI done. Remained NPO since midnight. Safety precautions maintained - bed in low position, call light in reach, side rails up x2.

## 2019-06-16 NOTE — NURSING
Pt transported to MRI via wheelchair with will Maher RN. MRI notified pt is on the way.      2110 - pt returned from MRI

## 2019-06-16 NOTE — NURSING
Cinthia Dhaliwal (sister) [phone]  Family asks to be notified on pt treatment plan and/or if pt will be having surgery. They live in [place].

## 2019-06-16 NOTE — ASSESSMENT & PLAN NOTE
- MRI negative for penile fracture  - OK for diet  - DC garcia catheter, voiding trial  - OK to dc home after voiding trial  - He will need to follow up with urology in [place] for hematuria work up

## 2019-06-16 NOTE — ASSESSMENT & PLAN NOTE
- His story and physical examination findings are not consistent with classic penile fracture. It is possible he ruptured a dorsal or superficial vein which caused the swelling and pain.   - Will order MRI of his penis to better evaluate for penile fracture.   - OK for diet tonight, will make him NPO at midnight  - Pain control, hydrocodone or other  - Do not believe he has Adriana's gangrene  - Will review MRI images, if a fracture is detected he will need penile exploration in the OR.   - Maintain garcia catheter overnight.

## 2019-06-16 NOTE — SUBJECTIVE & OBJECTIVE
Interval History:   MRI done overnight, does not show any evidence of penile fracture  Patient still in moderate pain, but this is controlled with oral analgesics  Aviles in place draining concentrated urine, patient has been NPO overnight    Review of Systems  Objective:     Temp:  [96.2 °F (35.7 °C)-98.2 °F (36.8 °C)] 98.1 °F (36.7 °C)  Pulse:  [51-65] 51  Resp:  [14-18] 14  SpO2:  [92 %-100 %] 92 %  BP: (102-129)/(53-68) 122/64     Body mass index is 31.82 kg/m².           Drains     Drain                 Urethral Catheter 06/15/19 1130 Non-latex 16 Fr. less than 1 day                Physical Exam   Constitutional: He appears well-developed and well-nourished.   HENT:   Head: Normocephalic and atraumatic.   Eyes: EOM are normal. No scleral icterus.   Cardiovascular: Normal rate and regular rhythm.    Pulmonary/Chest: Effort normal. No respiratory distress.   Abdominal: Soft. He exhibits no distension. There is no tenderness.   Genitourinary:   Genitourinary Comments:   Penis is uncircumcised, mild to moderate diffuse edema and ecchymosis involving penile shaft and scrotum, this is improving. Foreskin is able to be retracted and expose glans fully. His pain is improving at the distal left aspect of his corpora. Aviles catheter in place draining clear yellow urine.     Scrotal wall edema, no loss of rugae. Bilateral testicles palpable and non-tender. No crepitus or fluctuance.   Musculoskeletal: He exhibits no edema.   Neurological: He is alert.   Skin: Skin is warm and dry.     Psychiatric: He has a normal mood and affect. His behavior is normal.       Significant Labs:    BMP:  Recent Labs   Lab 06/15/19  0507 06/16/19  0346    137   K 3.6 3.8    104   CO2 21* 28   BUN 11 11   CREATININE 1.1 1.2   CALCIUM 9.3 8.8       CBC:   Recent Labs   Lab 06/15/19  0507 06/16/19  0346   WBC 12.23 8.62   HGB 13.3* 12.5*   HCT 40.1 39.7*    260       All pertinent labs results from the past 24 hours have been  reviewed.    Significant Imaging:  MRI: I have reviewed all results within the past 24 hours and my personal findings are:  tunica albuginea appear intact, distally more difficult to assess althought it does not appear that there is any evidence of penile fracture, penile and scrotal wall edema

## 2019-06-16 NOTE — CONSULTS
[facility]  Urology  Consult Note    Patient Name: Cande Saunders Sr.  MRN: [id number]  Admission Date: 6/15/2019  Hospital Length of Stay: 0   Code Status: Full Code   Attending Provider: Gomez Grande MD  Consulting Provider: Sylvester Sams MD  Primary Care Physician: Kenyon Garnica MD  Principal Problem:Penile trauma    Inpatient consult to Urology  Consult performed by: Sylvester Sams MD  Consult ordered by: Andrew Martel MD          Subjective:     HPI:  54yo M with history of tobacco abuse who was transferred from [facility] for concern of penile fracture. Thursday night he engaged in intercourse and had no pain or rapid detumescence and did not hear or feel a pop. Later that night he voided clear urine without difficulty. At 5 am on Friday morning he woke up with penile pain. He had difficulty urinating. Throughout the day the pain persisted and his penis and scrotum began to swell and he drove to the ED in [place].     Due to the soft tissue swelling in the ED he was evaluated and initially treated for possible Adriana's gangrene. General surgery was consulted and determined his issue to more likely be a penile fracture. He was unable to void while in the ED and his bladder scan showed >500. A garcia catheter was placed without issue. He was [place] [facility] [place] by urology. Scrotal ultrasound appears normal aside from scrotal wall edema. CT scan shows no subcutaneous air in perineum, scrotum or penis.      Past Medical History:   Diagnosis Date    Tobacco abuse        Past Surgical History:   Procedure Laterality Date    NONE         Review of patient's allergies indicates:  No Known Allergies    Family History     Problem Relation (Age of Onset)    Cancer Maternal Grandmother, Maternal Grandfather          Tobacco Use    Smoking status: Current Every Day Smoker     Packs/day: 1.00     Years: 27.00     Pack years: 27.00     Types: Cigarettes   Substance and  Sexual Activity    Alcohol use: Yes     Comment: occasional    Drug use: No    Sexual activity: Not on file       Review of Systems   Constitutional: Negative for activity change, appetite change, chills, fever and unexpected weight change.   HENT: Negative.    Eyes: Negative.    Respiratory: Negative for chest tightness and shortness of breath.    Cardiovascular: Negative for chest pain.   Gastrointestinal: Negative for abdominal distention, abdominal pain, nausea and vomiting.   Genitourinary: Positive for difficulty urinating, hematuria, penile pain, penile swelling and scrotal swelling. Negative for flank pain, testicular pain and urgency.   Musculoskeletal: Negative.    Skin: Negative.    Neurological: Negative.    Psychiatric/Behavioral: Negative.        Objective:     Temp:  [96.2 °F (35.7 °C)-98 °F (36.7 °C)] 96.2 °F (35.7 °C)  Pulse:  [58-92] 58  Resp:  [16-20] 18  SpO2:  [95 %-100 %] 97 %  BP: (102-153)/(62-92) 106/62     There is no height or weight on file to calculate BMI.           Drains     Drain                 Urethral Catheter 06/15/19 1130 Non-latex 16 Fr. less than 1 day                Physical Exam   Constitutional: He appears well-developed and well-nourished.   HENT:   Head: Normocephalic and atraumatic.   Eyes: EOM are normal. No scleral icterus.   Cardiovascular: Normal rate and regular rhythm.    Pulmonary/Chest: Effort normal. No respiratory distress.   Abdominal: Soft. He exhibits no distension. There is no tenderness.   Genitourinary:   Genitourinary Comments:   Penis is uncircumcised, mild to moderate diffuse edema and ecchymosis involving penile shaft and scrotum. Foreskin is able to be retracted and expose glans fully. He has pain more severe at the distal left aspect of his corpora. Aviles catheter in place draining clear yellow urine.     Scrotal wall edema, no loss of rugae. Bilateral testicles palpable and non-tender. No crepitus or fluctuance.   Musculoskeletal: He exhibits no  edema.   Neurological: He is alert.   Skin: Skin is warm and dry.     Psychiatric: He has a normal mood and affect. His behavior is normal.       Significant Labs:    BMP:  Recent Labs   Lab 06/15/19  0507      K 3.6      CO2 21*   BUN 11   CREATININE 1.1   CALCIUM 9.3       CBC:  Recent Labs   Lab 06/15/19  0507   WBC 12.23   HGB 13.3*   HCT 40.1          Urine Studies:   Recent Labs   Lab 06/15/19  0556   COLORU Yellow   APPEARANCEUA Clear   PHUR 6.0   SPECGRAV <=1.005*   PROTEINUA Negative   GLUCUA Negative   KETONESU Negative   BILIRUBINUA Negative   OCCULTUA 3+*   NITRITE Negative   UROBILINOGEN Negative   LEUKOCYTESUR Negative   RBCUA 25*     All pertinent labs results from the past 24 hours have been reviewed.    Significant Imaging:  All pertinent imaging results/findings from the past 24 hours have been reviewed.                    Assessment and Plan:     Urinary retention  - His story and physical examination findings are not consistent with classic penile fracture. It is possible he ruptured a dorsal or superficial vein which caused the swelling and pain.   - Will order MRI of his penis to better evaluate for penile fracture.   - OK for diet tonight, will make him NPO at midnight  - Pain control, hydrocodone or other  - Do not believe he has Adriana's gangrene  - Will review MRI images, if a fracture is detected he will need penile exploration in the OR.   - Maintain garcia catheter overnight.         VTE Risk Mitigation (From admission, onward)        Ordered     IP VTE HIGH RISK PATIENT  Once      06/15/19 1857     Reason for No Pharmacological VTE Prophylaxis  Once      06/15/19 1857          Thank you for your consult. I will follow-up with patient. Please contact us if you have any additional questions.    Sylvester Sams MD  Urology  [facility]

## 2019-06-16 NOTE — DISCHARGE INSTRUCTIONS
- Take roxicodone 10mg every 6 hours as needed for pain  - Take tamsulosin 0.4mg daily  - Follow up with outpatient urology clinic in [place] to check progress  - Present to nearest emergency department for evaluation if unable to void urine

## 2019-06-16 NOTE — HOSPITAL COURSE
MRI penis shows no obvious tear of tunica that would be consistent with a penile fracture  Urology recommends for trial of void, which patient has passed.   Ready for discharge home with PRN analgesia and follow up in urology clinic [place]

## 2019-06-16 NOTE — PROGRESS NOTES
[facility]  Urology  Progress Note    Patient Name: Cande Saunders Sr.  MRN: [id number]  Admission Date: 6/15/2019  Hospital Length of Stay: 0 days   Code Status: Full Code   Attending Provider: Gomez Grande MD  Primary Care Physician: Kenyon Garnica MD    Subjective:     HPI:  54yo M with history of tobacco abuse who was transferred from [facility] for concern of penile fracture. Thursday night he engaged in intercourse and had no pain or rapid detumescence and did not hear or feel a pop. Later that night he voided clear urine without difficulty. At 5 am on Friday morning he woke up with penile pain. He had difficulty urinating and did think he saw a pink tinge to his urine his first void on Friday morning. Throughout the day the pain persisted and his penis and scrotum began to swell and he drove to the ED in [place].     Due to the soft tissue swelling in the ED he was evaluated and initially treated for possible Adriana's gangrene. General surgery was consulted and determined his issue to more likely be a penile fracture. He was unable to void while in the ED and his bladder scan showed >500. A garcia catheter was placed without issue. He was transferred to [facility] for evaluation by urology. Scrotal ultrasound appears normal aside from scrotal wall edema. CT scan shows no subcutaneous air in perineum, scrotum or penis.      Interval History:   MRI done overnight, does not show any evidence of penile fracture  Patient still in moderate pain, but this is controlled with oral analgesics  Garcia in place draining concentrated urine, patient has been NPO overnight    Review of Systems  Objective:     Temp:  [96.2 °F (35.7 °C)-98.2 °F (36.8 °C)] 98.1 °F (36.7 °C)  Pulse:  [51-65] 51  Resp:  [14-18] 14  SpO2:  [92 %-100 %] 92 %  BP: (102-129)/(53-68) 122/64     Body mass index is 31.82 kg/m².           Drains     Drain                 Urethral Catheter 06/15/19 1130 Non-latex 16 Fr. less than 1  day                Physical Exam   Constitutional: He appears well-developed and well-nourished.   HENT:   Head: Normocephalic and atraumatic.   Eyes: EOM are normal. No scleral icterus.   Cardiovascular: Normal rate and regular rhythm.    Pulmonary/Chest: Effort normal. No respiratory distress.   Abdominal: Soft. He exhibits no distension. There is no tenderness.   Genitourinary:   Genitourinary Comments:   Penis is uncircumcised, mild to moderate diffuse edema and ecchymosis involving penile shaft and scrotum, this is improving. Foreskin is able to be retracted and expose glans fully. His pain is improving at the distal left aspect of his corpora. Garcia catheter in place draining clear yellow urine.     Scrotal wall edema, no loss of rugae. Bilateral testicles palpable and non-tender. No crepitus or fluctuance.   Musculoskeletal: He exhibits no edema.   Neurological: He is alert.   Skin: Skin is warm and dry.     Psychiatric: He has a normal mood and affect. His behavior is normal.       Significant Labs:    BMP:  Recent Labs   Lab 06/15/19  0507 06/16/19  0346    137   K 3.6 3.8    104   CO2 21* 28   BUN 11 11   CREATININE 1.1 1.2   CALCIUM 9.3 8.8       CBC:   Recent Labs   Lab 06/15/19  0507 06/16/19  0346   WBC 12.23 8.62   HGB 13.3* 12.5*   HCT 40.1 39.7*    260       All pertinent labs results from the past 24 hours have been reviewed.    Significant Imaging:  MRI: I have reviewed all results within the past 24 hours and my personal findings are:  tunica albuginea appear intact, distally more difficult to assess althought it does not appear that there is any evidence of penile fracture, penile and scrotal wall edema                  Assessment/Plan:     Urinary retention  - MRI negative for penile fracture  - OK for diet  - DC garcia catheter, voiding trial  - OK to dc home after voiding trial  - He will need to follow up with urology in  for hematuria work up  - He should start flomax  0.4mg daily for difficulty urinating        VTE Risk Mitigation (From admission, onward)        Ordered     IP VTE HIGH RISK PATIENT  Once      06/15/19 1857     Reason for No Pharmacological VTE Prophylaxis  Once      06/15/19 1857          Sylvester Sams MD  Urology  [facility]

## 2019-06-16 NOTE — NURSING
Urology notified by Thom that pt voided 50ml after garcia removed and current bladder scan results are 50ml. Ok to discharge pt per Urology. Awaiting primary team to write discharge orders

## 2019-06-16 NOTE — DISCHARGE SUMMARY
[facility] Hospital Medicine  Discharge Summary      Patient Name: Cande Saunders Sr.  MRN: [id number]  Admission Date: 6/15/2019  Hospital Length of Stay: 0 days  Discharge Date and Time:  06/16/2019 1:00 PM  Attending Physician: Randal Pastrana MD   Discharging Provider: Derek Bustillo MD  Primary Care Provider: Kenyon Garnica MD  Hospital Medicine Team: [facility] HOSP MED 1 Derek Bustillo MD    HPI:   53M PMHx Tobacco use, who presents as a transfer from [facility] with scrotal and penile swelling for Urology evaluation. He reported having sexual intercourse Thursday night, and penile pain and swelling, purple discoloration started Friday. Pt notes dysuria, hematuria, and difficulty starting stream. Denies blood in stool, fever, chills, abd pain, numbness, weakness, and all other sxs at this time.  In ER, WBC 12.23, CRP 23.4, u/s scrotum and testicles showed severe scrotal wall thickening and edema. CT abdomen/pelvis showed perineal/genital soft tissue edema, swelling, and small bilateral hydroceles. Patient doesn't recall during intercourse if he had penile pain or other hearing/feeling any popping sound. Nurses did bladder scan on Med/Surg and found 545 ml urine. 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 Dr. Arsen Bustamante, General Surgery, examined the patient and stated he didn't think this was gangrene based on reviewing the CT and U/S and felt it likely was a penile fracture given the history of resent intercourse. [facility], Dr. Cai, discussed with Urology in [place]. Who felt garcia should be placed. Transfer request placed to [place] For urology service. Surrogate decision Maker: Sister, Cinthia Saunders. He was admitted to [facility] medicine for penile swelling suspious for penile fracture with urological consultation pending.     * No surgery found *      Hospital Course:   MRI penis shows no obvious tear of tunica that would be consistent with a penile  fracture  Urology recommends for trial of void, which patient has passed.   Ready for discharge home with PRN analgesia and follow up in urology clinic [place]     Physical Exam   Constitutional: He is oriented to person, place, and time. He appears well-developed and well-nourished. He appears distressed.   Lying in bed, expression of discomfort. Pain has improved from previous day with use of PRNs   HENT:   Head: Normocephalic and atraumatic.   Eyes: Conjunctivae are normal. No scleral icterus.   Neck: Neck supple. No JVD present.   Cardiovascular: Normal rate, regular rhythm and normal heart sounds.   Pulmonary/Chest: Effort normal and breath sounds normal.   Abdominal: Soft. There is no tenderness.   Genitourinary: Penile tenderness present.   Genitourinary Comments: Penile/scrotal bruising with associated edema   Neurological: He is alert and oriented to person, place, and time.   Skin: Skin is warm and dry. Rash (penile/scrotal ecchymosis) noted. He is not diaphoretic.   Psychiatric: He has a normal mood and affect.         Consults:   Consults (From admission, onward)        Status Ordering Provider     Inpatient consult to Urology  Once     Provider:  (Not yet assigned)    Completed MONICA GARCIA W          * Penile trauma  Engaged in sexual intercourse early Friday morning around 4am  - Denies experiencing any sudden pain or popping sound during intercourse, but later that day noted progressive soreness and swelling start to develop.   - No systemic symptoms, partner without any signs or symptoms of STI, patient denies any hx of STI  - Presented to [facility] ED c/o urinary hesitance, dysuria, hematuria  - UA w/ 3+ RBC otherwise unremarkable  - [facility]US/[facility] c/f penile fracture, transferred to [facility] for urology evaluation    A/P  - Aviles catheter in situ  - Urology consult  - PRN analgesia  - F/U UCx and labs --> unremarkable  - MRI penis/scrotum --> No tear of tunica or sign of penile fracture  -  Received 1x dose clindamycin and zosyn at [facility] as was initial differential of Adriana's gangrene, though now thought to be unlikely --> further ABx per urology recs  - Urology recommending discharge with outpatient urology clinic follow up in [place] after patient passed trial of void      Urinary retention  Garcia catheter in situ  Urology consulted  - Trial of void 6/16 AM, patient voiding urine well  - Per urology ok to discharge without garcia catheter, follow up at urology clinic [place]        Final Active Diagnoses:    Diagnosis Date Noted POA    PRINCIPAL PROBLEM:  Penile trauma [S39.94XA] 06/15/2019 Yes    Tobacco abuse [Z72.0] 06/15/2019 Yes    Urinary retention [R33.9] 06/15/2019 Yes      Problems Resolved During this Admission:       Discharged Condition: stable    Disposition: Home or Self Care    Follow Up:  Follow-up Information     Jerardo Gonzales IV, MD. Schedule an appointment as soon as possible for a visit in 2 weeks.    Specialty:  Urology  Why:  gross hematuria  Contact information:  [address] [place] [address] [address] [place] [id number] [phone]                 Patient Instructions:      Ambulatory Referral to Urology   Referral Priority: Routine Referral Type: Consultation   Referral Reason: Specialty Services Required   Requested Specialty: Urology   Number of Visits Requested: 1       Significant Diagnostic Studies: Labs: All labs within the past 24 hours have been reviewed    Pending Diagnostic Studies:     None         Medications:  Reconciled Home Medications:      Medication List      START taking these medications    oxyCODONE 10 mg Tab immediate release tablet  Commonly known as:  ROXICODONE  Take 1 tablet (10 mg total) by mouth every 6 (six) hours as needed.     tamsulosin 0.4 mg Cap  Commonly known as:  FLOMAX  Take 1 capsule (0.4 mg total) by mouth once daily.  Start taking on:  6/17/2019        STOP taking these medications    clindamycin 900 MG/50 ML D5W 900 mg/50 mL  IVPB  Commonly known as:  CLEOCIN     diclofenac 50 MG EC tablet  Commonly known as:  VOLTAREN     PIPERACILLIN-TAZOBACTAM 4.5G/100ML D5W IVPB (READY TO MIX)            Indwelling Lines/Drains at time of discharge:   Lines/Drains/Airways          None          Time spent on the discharge of patient: 30 minutes  Patient was seen and examined on the date of discharge and determined to be suitable for discharge.         Derek Bustillo MD  Department of Hospital Medicine  [facility]

## 2019-06-16 NOTE — NURSING
Pt awake, alert, nad. Pt dressed and ready for discharge awaiting ride home. RX delivered to bedside by pharmacy. Pt states he has pain medications as well.

## 2019-06-16 NOTE — ASSESSMENT & PLAN NOTE
Garcia catheter in situ  Urology consulted  - Trial of void 6/16 AM, patient voiding urine well  - Per urology ok to discharge without garcia catheter, follow up at urology clinic [place]

## 2019-06-20 LAB
BACTERIA BLD CULT: NORMAL
BACTERIA BLD CULT: NORMAL

## 2020-01-11 ENCOUNTER — HOSPITAL ENCOUNTER (EMERGENCY)
Facility: HOSPITAL | Age: 55
Discharge: HOME | End: 2020-01-11
Attending: EMERGENCY MEDICINE
Payer: MEDICAID

## 2020-01-11 VITALS
HEIGHT: 66 IN | TEMPERATURE: 98 F | HEART RATE: 73 BPM | RESPIRATION RATE: 14 BRPM | SYSTOLIC BLOOD PRESSURE: 125 MMHG | OXYGEN SATURATION: 95 % | BODY MASS INDEX: 33.59 KG/M2 | WEIGHT: 209 LBS | DIASTOLIC BLOOD PRESSURE: 71 MMHG

## 2020-01-11 DIAGNOSIS — R07.9 CHEST PAIN: ICD-10-CM

## 2020-01-11 LAB
ALBUMIN SERPL BCP-MCNC: 3.7 G/DL (ref 3.5–5.2)
ALP SERPL-CCNC: 71 U/L (ref 55–135)
ALT SERPL W/O P-5'-P-CCNC: 15 U/L (ref 10–44)
ANION GAP SERPL CALC-SCNC: 12 MMOL/L (ref 8–16)
AST SERPL-CCNC: 16 U/L (ref 10–40)
BASOPHILS # BLD AUTO: 0.07 K/UL (ref 0–0.2)
BASOPHILS NFR BLD: 0.7 % (ref 0–1.9)
BILIRUB SERPL-MCNC: 0.3 MG/DL (ref 0.1–1)
BNP SERPL-MCNC: <10 PG/ML (ref 0–99)
BUN SERPL-MCNC: 12 MG/DL (ref 6–20)
CALCIUM SERPL-MCNC: 9.3 MG/DL (ref 8.7–10.5)
CHLORIDE SERPL-SCNC: 107 MMOL/L (ref 95–110)
CO2 SERPL-SCNC: 23 MMOL/L (ref 23–29)
CREAT SERPL-MCNC: 1.3 MG/DL (ref 0.5–1.4)
DIFFERENTIAL METHOD: ABNORMAL
EOSINOPHIL # BLD AUTO: 0.4 K/UL (ref 0–0.5)
EOSINOPHIL NFR BLD: 3.7 % (ref 0–8)
ERYTHROCYTE [DISTWIDTH] IN BLOOD BY AUTOMATED COUNT: 12.2 % (ref 11.5–14.5)
EST. GFR  (AFRICAN AMERICAN): >60 ML/MIN/1.73 M^2
EST. GFR  (NON AFRICAN AMERICAN): >60 ML/MIN/1.73 M^2
GLUCOSE SERPL-MCNC: 179 MG/DL (ref 70–110)
HCT VFR BLD AUTO: 41.4 % (ref 40–54)
HCV AB SERPL QL IA: NEGATIVE
HGB BLD-MCNC: 13.3 G/DL (ref 14–18)
HIV 1+2 AB+HIV1 P24 AG SERPL QL IA: NEGATIVE
IMM GRANULOCYTES # BLD AUTO: 0.05 K/UL (ref 0–0.04)
IMM GRANULOCYTES NFR BLD AUTO: 0.5 % (ref 0–0.5)
LYMPHOCYTES # BLD AUTO: 4 K/UL (ref 1–4.8)
LYMPHOCYTES NFR BLD: 37.1 % (ref 18–48)
MCH RBC QN AUTO: 33.2 PG (ref 27–31)
MCHC RBC AUTO-ENTMCNC: 32.1 G/DL (ref 32–36)
MCV RBC AUTO: 103 FL (ref 82–98)
MONOCYTES # BLD AUTO: 1.2 K/UL (ref 0.3–1)
MONOCYTES NFR BLD: 10.9 % (ref 4–15)
NEUTROPHILS # BLD AUTO: 5 K/UL (ref 1.8–7.7)
NEUTROPHILS NFR BLD: 47.1 % (ref 38–73)
NRBC BLD-RTO: 0 /100 WBC
PLATELET # BLD AUTO: 292 K/UL (ref 150–350)
PMV BLD AUTO: 9.7 FL (ref 9.2–12.9)
POTASSIUM SERPL-SCNC: 3.6 MMOL/L (ref 3.5–5.1)
PROT SERPL-MCNC: 7.2 G/DL (ref 6–8.4)
RBC # BLD AUTO: 4.01 M/UL (ref 4.6–6.2)
SODIUM SERPL-SCNC: 142 MMOL/L (ref 136–145)
TROPONIN I SERPL DL<=0.01 NG/ML-MCNC: 0.01 NG/ML (ref 0–0.03)
TROPONIN I SERPL DL<=0.01 NG/ML-MCNC: <0.006 NG/ML (ref 0–0.03)
WBC # BLD AUTO: 10.67 K/UL (ref 3.9–12.7)

## 2020-01-11 PROCEDURE — 99284 EMERGENCY DEPT VISIT MOD MDM: CPT | Mod: 25

## 2020-01-11 PROCEDURE — 36415 COLL VENOUS BLD VENIPUNCTURE: CPT

## 2020-01-11 PROCEDURE — 93005 ELECTROCARDIOGRAM TRACING: CPT

## 2020-01-11 PROCEDURE — 83880 ASSAY OF NATRIURETIC PEPTIDE: CPT

## 2020-01-11 PROCEDURE — 80053 COMPREHEN METABOLIC PANEL: CPT

## 2020-01-11 PROCEDURE — 86703 HIV-1/HIV-2 1 RESULT ANTBDY: CPT

## 2020-01-11 PROCEDURE — 93010 ELECTROCARDIOGRAM REPORT: CPT | Mod: ,,, | Performed by: INTERNAL MEDICINE

## 2020-01-11 PROCEDURE — 85025 COMPLETE CBC W/AUTO DIFF WBC: CPT

## 2020-01-11 PROCEDURE — 84484 ASSAY OF TROPONIN QUANT: CPT

## 2020-01-11 PROCEDURE — 25000003 PHARM REV CODE 250: Performed by: EMERGENCY MEDICINE

## 2020-01-11 PROCEDURE — 86803 HEPATITIS C AB TEST: CPT

## 2020-01-11 PROCEDURE — 93010 EKG 12-LEAD: ICD-10-PCS | Mod: ,,, | Performed by: INTERNAL MEDICINE

## 2020-01-11 RX ORDER — ASPIRIN 325 MG
325 TABLET ORAL
Status: COMPLETED | OUTPATIENT
Start: 2020-01-11 | End: 2020-01-11

## 2020-01-11 RX ADMIN — ASPIRIN 325 MG: 325 TABLET ORAL at 01:01

## 2020-01-11 NOTE — ED NOTES
Armband checked, patient verified - Verified by spelling and stated name on armband along with . Pt. AAOX4. VSS, resp. E/u. Pt. C/o left anterior chest pain without radiation (rates 8/10; describes as sharp, stabbing). Pt. States that he was at rest watching television when the pain began approximately 2hr PTA. Denies any PMH. Denies any other symptoms. Denies any home medications. Cardiac monitor in place - sinus rhythm on monitor. Call light in reach. WCTM.

## 2020-01-11 NOTE — ED NOTES
Pt. Lying quietly on stretcher in NAD. VSS, resp. E/u. AAOx4. Medication order addressed. Lights turned off in pt's room to allow him to rest. Call light in reach. RYANTM.

## 2020-01-11 NOTE — ED NOTES
Pt. Resting quietly on stretcher with eyes closed, snoring. Pt. Arouses to touch. Denies needs. NAD, VSS, resp. E/u. WCTM.

## 2020-01-11 NOTE — ED PROVIDER NOTES
SCRIBE #1 NOTE: I, Estefania Kat, am scribing for, and in the presence of, Alexandra Grier MD. I have scribed the entire note.       History     Chief Complaint   Patient presents with    Chest Pain     L side burning cp that began suddenly while at rest about 1hr pta; reports pain is now intermittent 8/10; denies any PMH     Review of patient's allergies indicates:  No Known Allergies      History of Present Illness     HPI    1/11/2020, 1:50 AM  History obtained from the patient      History of Present Illness: Cande Saunders Sr. is a 54 y.o. male patient with a PMHx of tobacco abuse who presents to the Emergency Department for evaluation of CP which onset gradually 1 hour PTA. Pt states L side of chest suddenly began burning while at rest. Symptoms are intermittent and 8/10 in severity. No mitigating or exacerbating factors reported. No associated sxs reported. Pt reports he had diarrhea yesterday morning. Patient denies any fever/chills, fatigue, congestion, sore throat, SOB, cough, palpitations, nausea, emesis, dysuria, hematuria, back pain, rash, HA, dizziness, bruises/blds easily, and all other sxs at this time. No prior Tx reported. No further complaints or concerns at this time.       Arrival mode: Personal vehicle    PCP: Kenyon Garnica MD        Past Medical History:  Past Medical History:   Diagnosis Date    Tobacco abuse        Past Surgical History:  Past Surgical History:   Procedure Laterality Date    NONE           Family History:  Family History   Problem Relation Age of Onset    Cancer Maternal Grandmother     Cancer Maternal Grandfather        Social History:  Social History     Tobacco Use    Smoking status: Current Every Day Smoker     Packs/day: 1.00     Years: 27.00     Pack years: 27.00     Types: Cigarettes   Substance and Sexual Activity    Alcohol use: Yes     Comment: occasional    Drug use: No    Sexual activity: Not on file        Review of Systems     Review of Systems    Constitutional: Negative for chills, fatigue and fever.   HENT: Negative for congestion and sore throat.    Respiratory: Negative for cough and shortness of breath.    Cardiovascular: Positive for chest pain. Negative for palpitations.   Gastrointestinal: Positive for diarrhea. Negative for nausea and vomiting.   Genitourinary: Negative for dysuria and hematuria.   Musculoskeletal: Negative for back pain.   Skin: Negative for rash.   Neurological: Negative for dizziness and headaches.   Hematological: Does not bruise/bleed easily.   All other systems reviewed and are negative.     Physical Exam     Initial Vitals [01/11/20 0045]   BP Pulse Resp Temp SpO2   135/78 71 16 98.3 °F (36.8 °C) 96 %      MAP       --          Physical Exam  Nursing Notes and Vital Signs Reviewed.  Constitutional: Patient is in no acute distress. Well-developed and well-nourished.  Head: Atraumatic. Normocephalic.  Eyes: PERRL. EOM intact. Conjunctivae are not pale. No scleral icterus.  ENT: Mucous membranes are moist. Oropharynx is clear and symmetric.    Neck: Supple. Full ROM. No lymphadenopathy.  Cardiovascular: Regular rate. Regular rhythm. No murmurs, rubs, or gallops. Distal pulses are 2+ and symmetric.  Pulmonary/Chest: No respiratory distress. Clear to auscultation bilaterally. No wheezing or rales.  Abdominal: Soft and non-distended.  There is no tenderness.  No rebound, guarding, or rigidity. Good bowel sounds.  Genitourinary: No CVA tenderness  Musculoskeletal: Moves all extremities. No obvious deformities. No edema. No calf tenderness.  Skin: Warm and dry.  Neurological:  Alert, awake, and appropriate.  Normal speech.  No acute focal neurological deficits are appreciated.  Psychiatric: Normal affect. Good eye contact. Appropriate in content.     ED Course   Procedures  ED Vital Signs:  Vitals:    01/11/20 0045 01/11/20 0059 01/11/20 0101 01/11/20 0202   BP: 135/78 135/62 136/74 (!) 146/72   Pulse: 71 68 72 80   Resp: 16 17 14  "15   Temp: 98.3 °F (36.8 °C)      TempSrc: Oral      SpO2: 96% 97% 97% 95%   Weight: 94.8 kg (208 lb 15.9 oz)      Height: 5' 6" (1.676 m)       01/11/20 0301 01/11/20 0401 01/11/20 0601 01/11/20 0630   BP: (!) 126/59 134/66 125/71    Pulse: 72 73 62 73   Resp: 16 16 13 14   Temp:       TempSrc:       SpO2: (!) 94% (!) 92% (!) 93% 95%   Weight:       Height:           Abnormal Lab Results:  Labs Reviewed   CBC W/ AUTO DIFFERENTIAL - Abnormal; Notable for the following components:       Result Value    RBC 4.01 (*)     Hemoglobin 13.3 (*)     Mean Corpuscular Volume 103 (*)     Mean Corpuscular Hemoglobin 33.2 (*)     Immature Grans (Abs) 0.05 (*)     Mono # 1.2 (*)     All other components within normal limits   COMPREHENSIVE METABOLIC PANEL - Abnormal; Notable for the following components:    Glucose 179 (*)     All other components within normal limits   HIV 1 / 2 ANTIBODY   HEPATITIS C ANTIBODY   TROPONIN I   B-TYPE NATRIURETIC PEPTIDE   TROPONIN I        All Lab Results:  Results for orders placed or performed during the hospital encounter of 01/11/20   HIV 1/2 Ag/Ab (4th Gen)   Result Value Ref Range    HIV 1/2 Ag/Ab Negative Negative   Hepatitis C antibody   Result Value Ref Range    Hepatitis C Ab Negative Negative   CBC auto differential   Result Value Ref Range    WBC 10.67 3.90 - 12.70 K/uL    RBC 4.01 (L) 4.60 - 6.20 M/uL    Hemoglobin 13.3 (L) 14.0 - 18.0 g/dL    Hematocrit 41.4 40.0 - 54.0 %    Mean Corpuscular Volume 103 (H) 82 - 98 fL    Mean Corpuscular Hemoglobin 33.2 (H) 27.0 - 31.0 pg    Mean Corpuscular Hemoglobin Conc 32.1 32.0 - 36.0 g/dL    RDW 12.2 11.5 - 14.5 %    Platelets 292 150 - 350 K/uL    MPV 9.7 9.2 - 12.9 fL    Immature Granulocytes 0.5 0.0 - 0.5 %    Gran # (ANC) 5.0 1.8 - 7.7 K/uL    Immature Grans (Abs) 0.05 (H) 0.00 - 0.04 K/uL    Lymph # 4.0 1.0 - 4.8 K/uL    Mono # 1.2 (H) 0.3 - 1.0 K/uL    Eos # 0.4 0.0 - 0.5 K/uL    Baso # 0.07 0.00 - 0.20 K/uL    nRBC 0 0 /100 WBC    Gran% " 47.1 38.0 - 73.0 %    Lymph% 37.1 18.0 - 48.0 %    Mono% 10.9 4.0 - 15.0 %    Eosinophil% 3.7 0.0 - 8.0 %    Basophil% 0.7 0.0 - 1.9 %    Differential Method Automated    Comprehensive metabolic panel   Result Value Ref Range    Sodium 142 136 - 145 mmol/L    Potassium 3.6 3.5 - 5.1 mmol/L    Chloride 107 95 - 110 mmol/L    CO2 23 23 - 29 mmol/L    Glucose 179 (H) 70 - 110 mg/dL    BUN, Bld 12 6 - 20 mg/dL    Creatinine 1.3 0.5 - 1.4 mg/dL    Calcium 9.3 8.7 - 10.5 mg/dL    Total Protein 7.2 6.0 - 8.4 g/dL    Albumin 3.7 3.5 - 5.2 g/dL    Total Bilirubin 0.3 0.1 - 1.0 mg/dL    Alkaline Phosphatase 71 55 - 135 U/L    AST 16 10 - 40 U/L    ALT 15 10 - 44 U/L    Anion Gap 12 8 - 16 mmol/L    eGFR if African American >60 >60 mL/min/1.73 m^2    eGFR if non African American >60 >60 mL/min/1.73 m^2   Troponin I #1   Result Value Ref Range    Troponin I <0.006 0.000 - 0.026 ng/mL   B-Type natriuretic peptide (BNP)   Result Value Ref Range    BNP <10 0 - 99 pg/mL   Troponin I #2   Result Value Ref Range    Troponin I 0.015 0.000 - 0.026 ng/mL       The EKG was ordered, reviewed, and independently interpreted by the ED provider.  Interpretation time: 0:48:03  Rate: 67 BPM  Rhythm: normal sinus rhythm  Interpretation: No acute ST changes. No STEMI.           The Emergency Provider reviewed the vital signs and test results, which are outlined above.     ED Discussion     5:59 AM: Reassessed pt at this time.  Pt states his condition has improved at this time. Discussed with pt all pertinent ED information and results. Discussed pt dx and plan of tx. Gave pt all f/u and return to the ED instructions. All questions and concerns were addressed at this time. Pt expresses understanding of information and instructions, and is comfortable with plan to discharge. Pt is stable for discharge.    I have discussed with patient and/or family/caretaker chest pain precautions, specifically to return for worsening chest pain, shortness of  breath, fever, or any concern.  I have low suspicion for cardiopulmonary, vascular, infectious, respiratory, or other emergent medical condition based on my evaluation in the ED.       Medical Decision Making:   Clinical Tests:   Lab Tests: Ordered and Reviewed  Medical Tests: Ordered and Reviewed           ED Medication(s):  Medications   aspirin tablet 325 mg (325 mg Oral Given 1/11/20 0154)       There are no discharge medications for this patient.      Follow-up Information     Kenyon Garnica MD In 2 days.    Specialty:  Internal Medicine  Contact information:  [address] [facility] [address] [phone]             [facility]    Specialty:  Emergency Medicine  Why:  As needed, If symptoms worsen  Contact information:  [address] [facility] [address] [phone]                     Scribe Attestation:   Scribe #1: I performed the above scribed service and the documentation accurately describes the services I performed. I attest to the accuracy of the note.     Attending:   Physician Attestation Statement for Scribe #1: I, Alexandra Grier MD, personally performed the services described in this documentation, as scribed by Estefania Kat, in my presence, and it is both accurate and complete.           Clinical Impression       ICD-10-CM ICD-9-CM   1. Chest pain R07.9 786.50       Disposition:   Disposition: Discharged  Condition: Stable       Alexandra Grier MD  01/11/20 [id number]

## 2020-01-11 NOTE — ED NOTES
Pt. States that CP is resolved at this time. DC indicated per MD. DC instructions explained to pt., who verbalized understanding. NAD, VSS, resp. E/u. AAOx4. Ambulatory out of ED with even, steady gait. Copy of DC instructions provided to registration team member to give to patient with checkout. Pt. At registration desk for checkout.

## 2020-01-11 NOTE — ED NOTES
Pt. Resting quietly on stretcher with eyes closed, snoring. Pt. Aroused to touch. Pt. States that his chest pain is not 100% resolved, but has vastly improved. Pt. Rates his pain 3/10. Repeat troponin drawn and sent to lab. Pt. Denies needs. NAD, VSS, resp. E/u. WCTM.